# Patient Record
Sex: FEMALE | Race: WHITE | NOT HISPANIC OR LATINO | Employment: STUDENT | URBAN - METROPOLITAN AREA
[De-identification: names, ages, dates, MRNs, and addresses within clinical notes are randomized per-mention and may not be internally consistent; named-entity substitution may affect disease eponyms.]

---

## 2019-12-05 ENCOUNTER — TELEPHONE (OUTPATIENT)
Dept: SURGERY | Facility: CLINIC | Age: 24
End: 2019-12-05

## 2019-12-06 ENCOUNTER — TELEPHONE (OUTPATIENT)
Dept: SURGERY | Facility: CLINIC | Age: 24
End: 2019-12-06

## 2019-12-06 NOTE — TELEPHONE ENCOUNTER
Returned call to  regarding her daughter Arely daughter having Right Breast Pain . Patient has a Bilateral Breast Ultra Sound that was abnormal It shows that there is a 3.6cm mass in the right breast . Patient was offered and apt to see Bubba Navas on 11/18/19 patient refused wanted to wait till next year. Patients mother asking for any apt for her daughter. Spoke with Shell OG who said she would be happy to see her on 12/10/19 @ 8:30 am . This apt was scheduled informed patients mother of the date & time apt slip was mailed out on 12/06/19 .

## 2019-12-10 ENCOUNTER — OFFICE VISIT (OUTPATIENT)
Dept: SURGERY | Facility: CLINIC | Age: 24
End: 2019-12-10
Payer: COMMERCIAL

## 2019-12-10 VITALS
TEMPERATURE: 98 F | WEIGHT: 138.25 LBS | SYSTOLIC BLOOD PRESSURE: 128 MMHG | HEIGHT: 64 IN | BODY MASS INDEX: 23.6 KG/M2 | HEART RATE: 78 BPM | DIASTOLIC BLOOD PRESSURE: 76 MMHG

## 2019-12-10 DIAGNOSIS — N64.4 BREAST PAIN: ICD-10-CM

## 2019-12-10 DIAGNOSIS — Z91.89 AT HIGH RISK FOR BREAST CANCER: ICD-10-CM

## 2019-12-10 DIAGNOSIS — Z12.39 BREAST CANCER SCREENING: Primary | ICD-10-CM

## 2019-12-10 DIAGNOSIS — N63.10 BREAST MASS, RIGHT: ICD-10-CM

## 2019-12-10 PROCEDURE — 3008F PR BODY MASS INDEX (BMI) DOCUMENTED: ICD-10-PCS | Mod: CPTII,S$GLB,, | Performed by: PHYSICIAN ASSISTANT

## 2019-12-10 PROCEDURE — 99999 PR PBB SHADOW E&M-EST. PATIENT-LVL III: ICD-10-PCS | Mod: PBBFAC,,, | Performed by: PHYSICIAN ASSISTANT

## 2019-12-10 PROCEDURE — 99203 OFFICE O/P NEW LOW 30 MIN: CPT | Mod: S$GLB,,, | Performed by: PHYSICIAN ASSISTANT

## 2019-12-10 PROCEDURE — 3008F BODY MASS INDEX DOCD: CPT | Mod: CPTII,S$GLB,, | Performed by: PHYSICIAN ASSISTANT

## 2019-12-10 PROCEDURE — 99999 PR PBB SHADOW E&M-EST. PATIENT-LVL III: CPT | Mod: PBBFAC,,, | Performed by: PHYSICIAN ASSISTANT

## 2019-12-10 PROCEDURE — 99203 PR OFFICE/OUTPT VISIT, NEW, LEVL III, 30-44 MIN: ICD-10-PCS | Mod: S$GLB,,, | Performed by: PHYSICIAN ASSISTANT

## 2019-12-10 NOTE — PROGRESS NOTES
[facility]  [facility] [place]  12/10/2019       SUBJECTIVE:   Ms. Arely Valles is a 24 y.o. female who presents today complaining of a painful RIGHT breast mass.    History of Present Illness: Patient states her OBGYN noticed a mass in her RIGHT breast at age 18.  She thought it went away, but this past January she noticed it again and it has started bothering her more frequently.    On 11/15/19 She had a bilateral breast ultrasound at an outside facility that showed a 3.6 cm lobulated mass with circumscribed margins at the 8 o'clock position of the right breast.   This was given a differential diagnosis of a phyllodes tumor versus a fibroadenoma with low suspicion for malignancy (BIRADS 4A).  A biopsy was not performed and we do not have her imaging on file.    She denies any history of breast biopsies.  She denies any nipple discharge or nipple inversion.  She denies palpating any left breast masses.      Past Medical History:   Diagnosis Date    Angioedema 12/31/2014    AR (allergic rhinitis) 3/27/2013    Eczema      Past Surgical History:   Procedure Laterality Date    ADENOIDECTOMY      SINUS SURGERY      TONSILLECTOMY       Social History     Socioeconomic History    Marital status: Single     Spouse name: Not on file    Number of children: Not on file    Years of education: Not on file    Highest education level: Not on file   Occupational History    Not on file   Social Needs    Financial resource strain: Not on file    Food insecurity:     Worry: Not on file     Inability: Not on file    Transportation needs:     Medical: Not on file     Non-medical: Not on file   Tobacco Use    Smoking status: Never Smoker    Smokeless tobacco: Never Used   Substance and Sexual Activity    Alcohol use: No    Drug use: Not on file    Sexual activity: Not on file   Lifestyle    Physical activity:     Days per week: Not on file     Minutes per session: Not on file    Stress: Not on  "file   Relationships    Social connections:     Talks on phone: Not on file     Gets together: Not on file     Attends Anabaptist service: Not on file     Active member of club or organization: Not on file     Attends meetings of clubs or organizations: Not on file     Relationship status: Not on file   Other Topics Concern    Not on file   Social History Narrative    Not on file     Review of patient's allergies indicates:  No Known Allergies     Family History: Paternal grandmother was diagnosed with breast cancer at age 55.  Paternal uncle had pancreatic cancer. Maternal uncle had prostate cancer. Maternal grandfather and maternal aunt had a melanoma.    OB/Gyn history:    Number of pregnancies & age at first gestation:    Age of menarche & menopause: 14; N/A   Last menstrual period: "a few weeks ago".    Body mass index is 23.73 kg/m².   Contraceptive Use/ HRT: Denies HRT   Breastfeeding: N/A   Number of previous biopsies: 0    Tyrer-Cuzick Score: 23%    Review of Systems: Denies any chest pain or shortness of breath.  Denies any fever or chills.  See HPI/ Interval History for other systems reviewed.    OBJECTIVE:   Vitals:    12/10/19 0842   BP: 128/76   Pulse: 78   Temp: 98 °F (36.7 °C)      Physical Exam:  HEENT: Normocephalic, atraumatic.    General: alert and oriented; no acute distress.  Breast: ~3.5 cm mobile, rubbery mass at the 8'clock position of the right breast, 4 cm from the NAC.  No left breast masses. Normal color and contour of right and left breast.  No nipple inversion or discharge bilaterally.    Lymph: No palpable adjacent axillary lymph nodes.      ASSESSMENT:  Ms. Arely Valles is a 24 y.o. year old high risk female with a painful RIGHT breast mass.    PLAN:   We discussed that we will need to obtain her images and have our radiologists review them.  We can then schedule her for a biopsy to get a more accurate diagnosis.  The patient is interested in having this surgically excised " so will refer her to Dr. Keyes after the biopsy is completed.  We also discussed that her Tyrer-Cuzick score is elevated compared to the average woman, and she will need to start high risk screening at age 37.  I recommended that she see me around age 36 so we can order imaging at that time and discuss this in more detail.    ~Shell Harris PA-C      Surgical Oncology            12/10/2019

## 2019-12-30 ENCOUNTER — TELEPHONE (OUTPATIENT)
Dept: SURGERY | Facility: CLINIC | Age: 24
End: 2019-12-30

## 2019-12-30 NOTE — TELEPHONE ENCOUNTER
Contacted the patient regarding the message below. I stated to the pt mother that we haven't received her outside images yet. I stated to her once we receive them we will give her a call to schedule.         ----- Message from LENKA Lizarraga sent at 12/30/2019 12:19 PM CST -----  Contact: Pt Mom Zulay   Please let her know we need to get her outside images so our radiologists can review them before we schedule anything.  Have we not received them?      ----- Message -----  From: Edita Caban MA  Sent: 12/30/2019  11:01 AM CST  To: LENKA Lizarraga    See message below.  ----- Message -----  From: Libertad Velez  Sent: 12/30/2019  10:52 AM CST  To: Steven Goff Staff    Reason: Pt calling to get some clarification on her visit she had.  She stated that someone was suppose to get her Ultrasound sent over in order to schedule biopsy. She stated no one never reached out as of yet.    Communication: [phone]

## 2020-01-30 ENCOUNTER — TELEPHONE (OUTPATIENT)
Dept: SURGERY | Facility: CLINIC | Age: 25
End: 2020-01-30

## 2020-01-30 NOTE — TELEPHONE ENCOUNTER
----- Message from LENKA Lizarraga sent at 1/30/2020  3:45 PM CST -----  Oh ok.  Danna or Fam, would one of you mind calling the patient to have her bring in her images?  ~Shell    ----- Message -----  From: Rodney Fulton LPN  Sent: 1/30/2020   3:37 PM CST  To: Fam Dai MA, #    Shell,     The patient images were done at the [facility] and the Radiologist will need to review her images before I can schedule.  The radiologist can not see the images for [facility], the patient would have to get us a CD to upload for review.     Thanks,   Rodney   ----- Message -----  From: LENKA Lizarraga  Sent: 1/30/2020   3:26 PM CST  To: Rodney Fulton LPN, YVETTE Dawkins,  Please call this patient when you get a chance..she needs a breast biopsy.      ~Shell

## 2020-01-30 NOTE — TELEPHONE ENCOUNTER
Contacted the patient regarding the message below.  The patient did not answer, message left with my name and direct number for patient to contact back.  Shell Harris PA-C notified of this matter.

## 2020-02-03 ENCOUNTER — DOCUMENTATION ONLY (OUTPATIENT)
Dept: SURGERY | Facility: CLINIC | Age: 25
End: 2020-02-03

## 2020-02-03 NOTE — PROGRESS NOTES
Contacted the [facility] in [place] regarding breast imaging disc being mailed to our facility.  Per Katrina the disc was mailed out on Thursday 1/30/2020.

## 2020-02-06 ENCOUNTER — HOSPITAL ENCOUNTER (OUTPATIENT)
Dept: RADIOLOGY | Facility: HOSPITAL | Age: 25
Discharge: HOME | End: 2020-02-06
Attending: PHYSICIAN ASSISTANT
Payer: COMMERCIAL

## 2020-02-06 ENCOUNTER — TELEPHONE (OUTPATIENT)
Dept: SURGERY | Facility: CLINIC | Age: 25
End: 2020-02-06

## 2020-02-06 NOTE — TELEPHONE ENCOUNTER
Returned patient's mother call regarding the message below.  Informed the patient's mother that our office received a copy of the patient's breast imaging from [facility] in [place] on today and will have them submitted to the breast imaging radiologist to review before scheduling a breast biopsy.  Also, explained once the review is completed the breast imaging nurse will contact the patient to scheduled as needed.  The patient's mother voiced understanding of this information.

## 2020-02-06 NOTE — TELEPHONE ENCOUNTER
----- Message from Edita Caban MA sent at 2/6/2020 11:03 AM CST -----  Contact: Pt(Mother)      ----- Message -----  From: David Mathias  Sent: 2/6/2020   9:47 AM CST  To: Steven Goff Staff    Patient's mother called requesting an appt to have the patient's biopsy perform stated that the matter is urgent and is requesting a callback to discuss appt availability    [phone]

## 2020-02-10 ENCOUNTER — TELEPHONE (OUTPATIENT)
Dept: RADIOLOGY | Facility: HOSPITAL | Age: 25
End: 2020-02-10

## 2020-02-21 ENCOUNTER — HOSPITAL ENCOUNTER (OUTPATIENT)
Dept: RADIOLOGY | Facility: HOSPITAL | Age: 25
Discharge: HOME | End: 2020-02-21
Attending: PHYSICIAN ASSISTANT
Payer: COMMERCIAL

## 2020-02-21 DIAGNOSIS — N63.10 BREAST MASS, RIGHT: ICD-10-CM

## 2020-02-21 PROCEDURE — 25000003 PHARM REV CODE 250: Mod: PO | Performed by: PHYSICIAN ASSISTANT

## 2020-02-21 PROCEDURE — 88305 TISSUE EXAM BY PATHOLOGIST: ICD-10-PCS | Mod: 26,,, | Performed by: PATHOLOGY

## 2020-02-21 PROCEDURE — 27201044 US BREAST BIOPSY WITH IMAGING 1ST SITE RIGHT: Mod: PO

## 2020-02-21 PROCEDURE — 19083 BX BREAST 1ST LESION US IMAG: CPT | Mod: RT,,, | Performed by: RADIOLOGY

## 2020-02-21 PROCEDURE — A4648 IMPLANTABLE TISSUE MARKER: HCPCS | Mod: PO

## 2020-02-21 PROCEDURE — 88305 TISSUE EXAM BY PATHOLOGIST: CPT | Performed by: PATHOLOGY

## 2020-02-21 PROCEDURE — 19083 US BREAST BIOPSY WITH IMAGING 1ST SITE RIGHT: ICD-10-PCS | Mod: RT,,, | Performed by: RADIOLOGY

## 2020-02-21 PROCEDURE — 88305 TISSUE EXAM BY PATHOLOGIST: CPT | Mod: 26,,, | Performed by: PATHOLOGY

## 2020-02-21 RX ORDER — LIDOCAINE HYDROCHLORIDE AND EPINEPHRINE 20; 10 MG/ML; UG/ML
20 INJECTION, SOLUTION INFILTRATION; PERINEURAL ONCE
Status: COMPLETED | OUTPATIENT
Start: 2020-02-21 | End: 2020-02-21

## 2020-02-21 RX ORDER — LIDOCAINE HYDROCHLORIDE 10 MG/ML
5 INJECTION, SOLUTION EPIDURAL; INFILTRATION; INTRACAUDAL; PERINEURAL ONCE
Status: COMPLETED | OUTPATIENT
Start: 2020-02-21 | End: 2020-02-21

## 2020-02-21 RX ADMIN — LIDOCAINE HYDROCHLORIDE,EPINEPHRINE BITARTRATE 5 ML: 20; .01 INJECTION, SOLUTION INFILTRATION; PERINEURAL at 02:02

## 2020-02-21 RX ADMIN — LIDOCAINE HYDROCHLORIDE 1 ML: 10 INJECTION, SOLUTION EPIDURAL; INFILTRATION; INTRACAUDAL; PERINEURAL at 02:02

## 2020-02-26 LAB
COMMENT: NORMAL
FINAL PATHOLOGIC DIAGNOSIS: NORMAL
GROSS: NORMAL

## 2020-02-27 ENCOUNTER — TELEPHONE (OUTPATIENT)
Dept: SURGERY | Facility: CLINIC | Age: 25
End: 2020-02-27

## 2020-02-27 NOTE — TELEPHONE ENCOUNTER
Patient called with results of breast biopsy from 2/21/2020,   no atypia/benign, all questions answered, pt advised to follow up in 6 months with repeat imaging per core biopsy protocol.  reviewed biopsy results and results are benign and concordant.  Discussed scheduling patient with breast surgeon to discuss removing the area. She discussed this with DALIA Harris PA-C at her visit back in December of 2019. Patient stated she was not sure she wanted to have it removed anymore. I let her know that she can call us anytime to schedule the appt or come back to see Shell if she wanted to discuss it further.  Patient verbalized understanding.

## 2020-07-16 NOTE — TELEPHONE ENCOUNTER
Fam, the Right Breast US was abnormal. Its shows that she has a 3.6cm Mass in the right breast. Please contact patient to schedule.    Thanks,      ----- Message from Adryan Santana sent at 12/5/2019  2:12 PM CST -----  Contact: Zulay the Pt's mother      The caller states that the Pt has a lump in her right breast.  The caller states that the Pt has had US of the right breast.  Please contact the caller to discuss additional imaging and a consult appt as a New Pt.      Phone # [phone]    
ortho resident

## 2021-09-28 ENCOUNTER — HOSPITAL ENCOUNTER (EMERGENCY)
Facility: HOSPITAL | Age: 26
Discharge: HOME | End: 2021-09-28
Attending: EMERGENCY MEDICINE
Payer: COMMERCIAL

## 2021-09-28 VITALS
OXYGEN SATURATION: 97 % | HEART RATE: 70 BPM | BODY MASS INDEX: 20.83 KG/M2 | WEIGHT: 122 LBS | HEIGHT: 64 IN | SYSTOLIC BLOOD PRESSURE: 135 MMHG | RESPIRATION RATE: 18 BRPM | TEMPERATURE: 98 F | DIASTOLIC BLOOD PRESSURE: 87 MMHG

## 2021-09-28 DIAGNOSIS — R06.02 SHORTNESS OF BREATH: Primary | ICD-10-CM

## 2021-09-28 LAB
B-HCG UR QL: NEGATIVE
BASOPHILS # BLD AUTO: 0.03 K/UL (ref 0–0.2)
BASOPHILS NFR BLD: 0.3 % (ref 0–1.9)
CTP QC/QA: YES
CTP QC/QA: YES
D DIMER PPP IA.FEU-MCNC: <0.19 MG/L FEU
DIFFERENTIAL METHOD: ABNORMAL
EOSINOPHIL # BLD AUTO: 0 K/UL (ref 0–0.5)
EOSINOPHIL NFR BLD: 0 % (ref 0–8)
ERYTHROCYTE [DISTWIDTH] IN BLOOD BY AUTOMATED COUNT: 13 % (ref 11.5–14.5)
HCT VFR BLD AUTO: 39.5 % (ref 37–48.5)
HGB BLD-MCNC: 13.2 G/DL (ref 12–16)
IMM GRANULOCYTES # BLD AUTO: 0.05 K/UL (ref 0–0.04)
IMM GRANULOCYTES NFR BLD AUTO: 0.4 % (ref 0–0.5)
LYMPHOCYTES # BLD AUTO: 1.7 K/UL (ref 1–4.8)
LYMPHOCYTES NFR BLD: 14.9 % (ref 18–48)
MCH RBC QN AUTO: 29.7 PG (ref 27–31)
MCHC RBC AUTO-ENTMCNC: 33.4 G/DL (ref 32–36)
MCV RBC AUTO: 89 FL (ref 82–98)
MONOCYTES # BLD AUTO: 0.9 K/UL (ref 0.3–1)
MONOCYTES NFR BLD: 7.5 % (ref 4–15)
NEUTROPHILS # BLD AUTO: 8.7 K/UL (ref 1.8–7.7)
NEUTROPHILS NFR BLD: 76.9 % (ref 38–73)
NRBC BLD-RTO: 0 /100 WBC
PLATELET # BLD AUTO: 258 K/UL (ref 150–450)
PMV BLD AUTO: 10.4 FL (ref 9.2–12.9)
RBC # BLD AUTO: 4.44 M/UL (ref 4–5.4)
SARS-COV-2 RDRP RESP QL NAA+PROBE: NEGATIVE
WBC # BLD AUTO: 11.29 K/UL (ref 3.9–12.7)

## 2021-09-28 PROCEDURE — 93010 ELECTROCARDIOGRAM REPORT: CPT | Mod: ,,, | Performed by: INTERNAL MEDICINE

## 2021-09-28 PROCEDURE — 93005 ELECTROCARDIOGRAM TRACING: CPT

## 2021-09-28 PROCEDURE — 85025 COMPLETE CBC W/AUTO DIFF WBC: CPT | Performed by: EMERGENCY MEDICINE

## 2021-09-28 PROCEDURE — 99285 EMERGENCY DEPT VISIT HI MDM: CPT | Mod: 25

## 2021-09-28 PROCEDURE — 93010 EKG 12-LEAD: ICD-10-PCS | Mod: ,,, | Performed by: INTERNAL MEDICINE

## 2021-09-28 PROCEDURE — 81025 URINE PREGNANCY TEST: CPT | Performed by: EMERGENCY MEDICINE

## 2021-09-28 PROCEDURE — 87389 HIV-1 AG W/HIV-1&-2 AB AG IA: CPT | Performed by: EMERGENCY MEDICINE

## 2021-09-28 PROCEDURE — 86803 HEPATITIS C AB TEST: CPT | Performed by: EMERGENCY MEDICINE

## 2021-09-28 PROCEDURE — 80047 BASIC METABLC PNL IONIZED CA: CPT

## 2021-09-28 PROCEDURE — 36000 PLACE NEEDLE IN VEIN: CPT

## 2021-09-28 PROCEDURE — 85379 FIBRIN DEGRADATION QUANT: CPT | Performed by: EMERGENCY MEDICINE

## 2021-09-28 PROCEDURE — 99285 PR EMERGENCY DEPT VISIT,LEVEL V: ICD-10-PCS | Mod: CR,CS,, | Performed by: EMERGENCY MEDICINE

## 2021-09-28 PROCEDURE — 99285 EMERGENCY DEPT VISIT HI MDM: CPT | Mod: CR,CS,, | Performed by: EMERGENCY MEDICINE

## 2021-09-28 PROCEDURE — U0002 COVID-19 LAB TEST NON-CDC: HCPCS | Performed by: EMERGENCY MEDICINE

## 2021-09-29 LAB
BUN SERPL-MCNC: 14 MG/DL (ref 6–30)
CHLORIDE SERPL-SCNC: 102 MMOL/L (ref 95–110)
CREAT SERPL-MCNC: 0.7 MG/DL (ref 0.5–1.4)
GLUCOSE SERPL-MCNC: 116 MG/DL (ref 70–110)
HCT VFR BLD CALC: 41 %PCV (ref 36–54)
HCV AB SERPL QL IA: NEGATIVE
HIV 1+2 AB+HIV1 P24 AG SERPL QL IA: NEGATIVE
POC IONIZED CALCIUM: 1.24 MMOL/L (ref 1.06–1.42)
POC TCO2 (MEASURED): 26 MMOL/L (ref 23–29)
POTASSIUM BLD-SCNC: 4.2 MMOL/L (ref 3.5–5.1)
SAMPLE: ABNORMAL
SODIUM BLD-SCNC: 141 MMOL/L (ref 136–145)

## 2021-10-01 ENCOUNTER — LAB VISIT (OUTPATIENT)
Dept: LAB | Facility: HOSPITAL | Age: 26
End: 2021-10-01
Attending: INTERNAL MEDICINE
Payer: COMMERCIAL

## 2021-10-01 DIAGNOSIS — R53.83 FATIGUE, UNSPECIFIED TYPE: ICD-10-CM

## 2021-10-01 DIAGNOSIS — M79.10 MYALGIA: ICD-10-CM

## 2021-10-01 PROBLEM — F33.41 RECURRENT MAJOR DEPRESSIVE DISORDER, IN PARTIAL REMISSION: Status: ACTIVE | Noted: 2019-02-07

## 2021-10-01 LAB
25(OH)D3+25(OH)D2 SERPL-MCNC: 22 NG/ML (ref 30–96)
ALBUMIN SERPL BCP-MCNC: 4.5 G/DL (ref 3.5–5.2)
ALP SERPL-CCNC: 45 U/L (ref 55–135)
ALT SERPL W/O P-5'-P-CCNC: 41 U/L (ref 10–44)
ANION GAP SERPL CALC-SCNC: 8 MMOL/L (ref 8–16)
AST SERPL-CCNC: 19 U/L (ref 10–40)
BASOPHILS # BLD AUTO: 0.05 K/UL (ref 0–0.2)
BASOPHILS NFR BLD: 0.7 % (ref 0–1.9)
BILIRUB SERPL-MCNC: 0.6 MG/DL (ref 0.1–1)
BUN SERPL-MCNC: 16 MG/DL (ref 6–20)
CALCIUM SERPL-MCNC: 9.7 MG/DL (ref 8.7–10.5)
CHLORIDE SERPL-SCNC: 103 MMOL/L (ref 95–110)
CO2 SERPL-SCNC: 28 MMOL/L (ref 23–29)
CREAT SERPL-MCNC: 0.9 MG/DL (ref 0.5–1.4)
CRP SERPL-MCNC: 0.2 MG/L (ref 0–8.2)
DIFFERENTIAL METHOD: ABNORMAL
EOSINOPHIL # BLD AUTO: 0 K/UL (ref 0–0.5)
EOSINOPHIL NFR BLD: 0 % (ref 0–8)
ERYTHROCYTE [DISTWIDTH] IN BLOOD BY AUTOMATED COUNT: 13 % (ref 11.5–14.5)
ERYTHROCYTE [SEDIMENTATION RATE] IN BLOOD BY WESTERGREN METHOD: 11 MM/HR (ref 0–20)
EST. GFR  (AFRICAN AMERICAN): >60 ML/MIN/1.73 M^2
EST. GFR  (NON AFRICAN AMERICAN): >60 ML/MIN/1.73 M^2
GLUCOSE SERPL-MCNC: 74 MG/DL (ref 70–110)
HCT VFR BLD AUTO: 40.6 % (ref 37–48.5)
HGB BLD-MCNC: 13.6 G/DL (ref 12–16)
IMM GRANULOCYTES # BLD AUTO: 0.07 K/UL (ref 0–0.04)
IMM GRANULOCYTES NFR BLD AUTO: 0.9 % (ref 0–0.5)
LYMPHOCYTES # BLD AUTO: 2.3 K/UL (ref 1–4.8)
LYMPHOCYTES NFR BLD: 30.4 % (ref 18–48)
MCH RBC QN AUTO: 29.6 PG (ref 27–31)
MCHC RBC AUTO-ENTMCNC: 33.5 G/DL (ref 32–36)
MCV RBC AUTO: 89 FL (ref 82–98)
MONOCYTES # BLD AUTO: 0.7 K/UL (ref 0.3–1)
MONOCYTES NFR BLD: 8.8 % (ref 4–15)
NEUTROPHILS # BLD AUTO: 4.4 K/UL (ref 1.8–7.7)
NEUTROPHILS NFR BLD: 59.2 % (ref 38–73)
NRBC BLD-RTO: 0 /100 WBC
PLATELET # BLD AUTO: 268 K/UL (ref 150–450)
PMV BLD AUTO: 9.8 FL (ref 9.2–12.9)
POTASSIUM SERPL-SCNC: 4.1 MMOL/L (ref 3.5–5.1)
PROT SERPL-MCNC: 7.5 G/DL (ref 6–8.4)
RBC # BLD AUTO: 4.59 M/UL (ref 4–5.4)
SODIUM SERPL-SCNC: 139 MMOL/L (ref 136–145)
T4 FREE SERPL-MCNC: 0.99 NG/DL (ref 0.71–1.51)
TSH SERPL DL<=0.005 MIU/L-ACNC: 1.05 UIU/ML (ref 0.4–4)
VIT B12 SERPL-MCNC: 614 PG/ML (ref 210–950)
WBC # BLD AUTO: 7.46 K/UL (ref 3.9–12.7)

## 2021-10-01 PROCEDURE — 80053 COMPREHEN METABOLIC PANEL: CPT | Performed by: INTERNAL MEDICINE

## 2021-10-01 PROCEDURE — 86038 ANTINUCLEAR ANTIBODIES: CPT | Performed by: INTERNAL MEDICINE

## 2021-10-01 PROCEDURE — 36415 COLL VENOUS BLD VENIPUNCTURE: CPT | Performed by: INTERNAL MEDICINE

## 2021-10-01 PROCEDURE — 85025 COMPLETE CBC W/AUTO DIFF WBC: CPT | Performed by: INTERNAL MEDICINE

## 2021-10-01 PROCEDURE — 85652 RBC SED RATE AUTOMATED: CPT | Performed by: INTERNAL MEDICINE

## 2021-10-01 PROCEDURE — 86140 C-REACTIVE PROTEIN: CPT | Performed by: INTERNAL MEDICINE

## 2021-10-01 PROCEDURE — 84443 ASSAY THYROID STIM HORMONE: CPT | Performed by: INTERNAL MEDICINE

## 2021-10-01 PROCEDURE — 84439 ASSAY OF FREE THYROXINE: CPT | Performed by: INTERNAL MEDICINE

## 2021-10-01 PROCEDURE — 82607 VITAMIN B-12: CPT | Performed by: INTERNAL MEDICINE

## 2021-10-01 PROCEDURE — 82306 VITAMIN D 25 HYDROXY: CPT | Performed by: INTERNAL MEDICINE

## 2021-10-04 LAB — ANA SER QL IF: NORMAL

## 2021-10-06 ENCOUNTER — HOSPITAL ENCOUNTER (EMERGENCY)
Facility: HOSPITAL | Age: 26
Discharge: HOME | End: 2021-10-06
Attending: EMERGENCY MEDICINE
Payer: COMMERCIAL

## 2021-10-06 VITALS
WEIGHT: 124 LBS | OXYGEN SATURATION: 100 % | HEART RATE: 83 BPM | TEMPERATURE: 98 F | DIASTOLIC BLOOD PRESSURE: 63 MMHG | BODY MASS INDEX: 21.28 KG/M2 | RESPIRATION RATE: 16 BRPM | SYSTOLIC BLOOD PRESSURE: 107 MMHG

## 2021-10-06 DIAGNOSIS — M62.81 MUSCLE WEAKNESS (GENERALIZED): ICD-10-CM

## 2021-10-06 DIAGNOSIS — M25.50 POLYARTHRALGIA: ICD-10-CM

## 2021-10-06 DIAGNOSIS — R29.898 BILATERAL LEG WEAKNESS: Primary | ICD-10-CM

## 2021-10-06 DIAGNOSIS — R53.1 GENERALIZED WEAKNESS: ICD-10-CM

## 2021-10-06 DIAGNOSIS — R20.2 PARESTHESIAS: ICD-10-CM

## 2021-10-06 LAB
ALBUMIN SERPL BCP-MCNC: 4.1 G/DL (ref 3.5–5.2)
ALP SERPL-CCNC: 39 U/L (ref 55–135)
ALT SERPL W/O P-5'-P-CCNC: 51 U/L (ref 10–44)
ANION GAP SERPL CALC-SCNC: 11 MMOL/L (ref 8–16)
AST SERPL-CCNC: 29 U/L (ref 10–40)
B-HCG UR QL: NEGATIVE
BASOPHILS # BLD AUTO: 0.05 K/UL (ref 0–0.2)
BASOPHILS NFR BLD: 0.7 % (ref 0–1.9)
BILIRUB SERPL-MCNC: 0.6 MG/DL (ref 0.1–1)
BUN SERPL-MCNC: 17 MG/DL (ref 6–20)
CALCIUM SERPL-MCNC: 9.4 MG/DL (ref 8.7–10.5)
CHLORIDE SERPL-SCNC: 105 MMOL/L (ref 95–110)
CO2 SERPL-SCNC: 23 MMOL/L (ref 23–29)
CREAT SERPL-MCNC: 0.7 MG/DL (ref 0.5–1.4)
CTP QC/QA: YES
DIFFERENTIAL METHOD: ABNORMAL
EOSINOPHIL # BLD AUTO: 0.1 K/UL (ref 0–0.5)
EOSINOPHIL NFR BLD: 1.8 % (ref 0–8)
ERYTHROCYTE [DISTWIDTH] IN BLOOD BY AUTOMATED COUNT: 13 % (ref 11.5–14.5)
EST. GFR  (AFRICAN AMERICAN): >60 ML/MIN/1.73 M^2
EST. GFR  (NON AFRICAN AMERICAN): >60 ML/MIN/1.73 M^2
GLUCOSE SERPL-MCNC: 85 MG/DL (ref 70–110)
HCT VFR BLD AUTO: 38 % (ref 37–48.5)
HGB BLD-MCNC: 13.1 G/DL (ref 12–16)
IMM GRANULOCYTES # BLD AUTO: 0.04 K/UL (ref 0–0.04)
IMM GRANULOCYTES NFR BLD AUTO: 0.6 % (ref 0–0.5)
LYMPHOCYTES # BLD AUTO: 2.5 K/UL (ref 1–4.8)
LYMPHOCYTES NFR BLD: 36 % (ref 18–48)
MCH RBC QN AUTO: 29.8 PG (ref 27–31)
MCHC RBC AUTO-ENTMCNC: 34.5 G/DL (ref 32–36)
MCV RBC AUTO: 87 FL (ref 82–98)
MONOCYTES # BLD AUTO: 0.6 K/UL (ref 0.3–1)
MONOCYTES NFR BLD: 8.8 % (ref 4–15)
NEUTROPHILS # BLD AUTO: 3.5 K/UL (ref 1.8–7.7)
NEUTROPHILS NFR BLD: 52.1 % (ref 38–73)
NRBC BLD-RTO: 0 /100 WBC
PLATELET # BLD AUTO: 245 K/UL (ref 150–450)
PMV BLD AUTO: 10.1 FL (ref 9.2–12.9)
POTASSIUM SERPL-SCNC: 4.1 MMOL/L (ref 3.5–5.1)
PROT SERPL-MCNC: 6.8 G/DL (ref 6–8.4)
RBC # BLD AUTO: 4.39 M/UL (ref 4–5.4)
SODIUM SERPL-SCNC: 139 MMOL/L (ref 136–145)
WBC # BLD AUTO: 6.8 K/UL (ref 3.9–12.7)

## 2021-10-06 PROCEDURE — 85025 COMPLETE CBC W/AUTO DIFF WBC: CPT | Performed by: PHYSICIAN ASSISTANT

## 2021-10-06 PROCEDURE — 96374 THER/PROPH/DIAG INJ IV PUSH: CPT

## 2021-10-06 PROCEDURE — 99285 PR EMERGENCY DEPT VISIT,LEVEL V: ICD-10-PCS | Mod: ,,, | Performed by: PHYSICIAN ASSISTANT

## 2021-10-06 PROCEDURE — 80053 COMPREHEN METABOLIC PANEL: CPT | Performed by: PHYSICIAN ASSISTANT

## 2021-10-06 PROCEDURE — 99285 EMERGENCY DEPT VISIT HI MDM: CPT | Mod: 25

## 2021-10-06 PROCEDURE — A9585 GADOBUTROL INJECTION: HCPCS | Performed by: EMERGENCY MEDICINE

## 2021-10-06 PROCEDURE — 63600175 PHARM REV CODE 636 W HCPCS: Performed by: PHYSICIAN ASSISTANT

## 2021-10-06 PROCEDURE — 25000003 PHARM REV CODE 250: Performed by: PHYSICIAN ASSISTANT

## 2021-10-06 PROCEDURE — 25500020 PHARM REV CODE 255: Performed by: EMERGENCY MEDICINE

## 2021-10-06 PROCEDURE — 99285 EMERGENCY DEPT VISIT HI MDM: CPT | Mod: ,,, | Performed by: PHYSICIAN ASSISTANT

## 2021-10-06 PROCEDURE — 81025 URINE PREGNANCY TEST: CPT | Performed by: PHYSICIAN ASSISTANT

## 2021-10-06 RX ORDER — ACETAMINOPHEN 500 MG
1000 TABLET ORAL
Status: COMPLETED | OUTPATIENT
Start: 2021-10-06 | End: 2021-10-06

## 2021-10-06 RX ORDER — HYDROCODONE BITARTRATE AND ACETAMINOPHEN 5; 325 MG/1; MG/1
1 TABLET ORAL
Status: COMPLETED | OUTPATIENT
Start: 2021-10-06 | End: 2021-10-06

## 2021-10-06 RX ORDER — KETOROLAC TROMETHAMINE 30 MG/ML
10 INJECTION, SOLUTION INTRAMUSCULAR; INTRAVENOUS
Status: COMPLETED | OUTPATIENT
Start: 2021-10-06 | End: 2021-10-06

## 2021-10-06 RX ORDER — GADOBUTROL 604.72 MG/ML
6 INJECTION INTRAVENOUS
Status: COMPLETED | OUTPATIENT
Start: 2021-10-06 | End: 2021-10-06

## 2021-10-06 RX ORDER — METHOCARBAMOL 500 MG/1
1000 TABLET, FILM COATED ORAL
Status: COMPLETED | OUTPATIENT
Start: 2021-10-06 | End: 2021-10-06

## 2021-10-06 RX ADMIN — METHOCARBAMOL 1000 MG: 500 TABLET ORAL at 03:10

## 2021-10-06 RX ADMIN — KETOROLAC TROMETHAMINE 10 MG: 30 INJECTION, SOLUTION INTRAMUSCULAR at 03:10

## 2021-10-06 RX ADMIN — GADOBUTROL 6 ML: 604.72 INJECTION INTRAVENOUS at 08:10

## 2021-10-06 RX ADMIN — ACETAMINOPHEN 1000 MG: 500 TABLET ORAL at 03:10

## 2021-10-06 RX ADMIN — HYDROCODONE BITARTRATE AND ACETAMINOPHEN 1 TABLET: 5; 325 TABLET ORAL at 06:10

## 2021-10-26 PROBLEM — R20.2 PARESTHESIAS: Status: ACTIVE | Noted: 2021-10-22

## 2021-10-26 PROBLEM — M25.50 DIFFUSE ARTHRALGIA: Status: ACTIVE | Noted: 2021-10-22

## 2021-10-26 PROBLEM — M79.10 MYALGIA: Status: ACTIVE | Noted: 2021-10-22

## 2021-11-02 ENCOUNTER — OFFICE VISIT (OUTPATIENT)
Dept: ENDOCRINOLOGY | Facility: CLINIC | Age: 26
End: 2021-11-02
Payer: COMMERCIAL

## 2021-11-02 VITALS
SYSTOLIC BLOOD PRESSURE: 104 MMHG | HEART RATE: 68 BPM | DIASTOLIC BLOOD PRESSURE: 76 MMHG | BODY MASS INDEX: 23.71 KG/M2 | OXYGEN SATURATION: 94 % | RESPIRATION RATE: 18 BRPM | HEIGHT: 64 IN | WEIGHT: 138.88 LBS

## 2021-11-02 DIAGNOSIS — R73.09 ELEVATED RANDOM BLOOD GLUCOSE LEVEL: ICD-10-CM

## 2021-11-02 DIAGNOSIS — M25.50 DIFFUSE ARTHRALGIA: ICD-10-CM

## 2021-11-02 DIAGNOSIS — R74.8 LOW SERUM ALKALINE PHOSPHATASE: ICD-10-CM

## 2021-11-02 DIAGNOSIS — M79.10 MYALGIA: ICD-10-CM

## 2021-11-02 DIAGNOSIS — E55.9 VITAMIN D DEFICIENCY: ICD-10-CM

## 2021-11-02 PROCEDURE — 99999 PR PBB SHADOW E&M-EST. PATIENT-LVL IV: CPT | Mod: PBBFAC,,, | Performed by: INTERNAL MEDICINE

## 2021-11-02 PROCEDURE — 3008F PR BODY MASS INDEX (BMI) DOCUMENTED: ICD-10-PCS | Mod: CPTII,S$GLB,, | Performed by: INTERNAL MEDICINE

## 2021-11-02 PROCEDURE — 3078F PR MOST RECENT DIASTOLIC BLOOD PRESSURE < 80 MM HG: ICD-10-PCS | Mod: CPTII,S$GLB,, | Performed by: INTERNAL MEDICINE

## 2021-11-02 PROCEDURE — 3074F SYST BP LT 130 MM HG: CPT | Mod: CPTII,S$GLB,, | Performed by: INTERNAL MEDICINE

## 2021-11-02 PROCEDURE — 1159F MED LIST DOCD IN RCRD: CPT | Mod: CPTII,S$GLB,, | Performed by: INTERNAL MEDICINE

## 2021-11-02 PROCEDURE — 3074F PR MOST RECENT SYSTOLIC BLOOD PRESSURE < 130 MM HG: ICD-10-PCS | Mod: CPTII,S$GLB,, | Performed by: INTERNAL MEDICINE

## 2021-11-02 PROCEDURE — 1159F PR MEDICATION LIST DOCUMENTED IN MEDICAL RECORD: ICD-10-PCS | Mod: CPTII,S$GLB,, | Performed by: INTERNAL MEDICINE

## 2021-11-02 PROCEDURE — 99204 PR OFFICE/OUTPT VISIT, NEW, LEVL IV, 45-59 MIN: ICD-10-PCS | Mod: S$GLB,,, | Performed by: INTERNAL MEDICINE

## 2021-11-02 PROCEDURE — 99204 OFFICE O/P NEW MOD 45 MIN: CPT | Mod: S$GLB,,, | Performed by: INTERNAL MEDICINE

## 2021-11-02 PROCEDURE — 99999 PR PBB SHADOW E&M-EST. PATIENT-LVL IV: ICD-10-PCS | Mod: PBBFAC,,, | Performed by: INTERNAL MEDICINE

## 2021-11-02 PROCEDURE — 3078F DIAST BP <80 MM HG: CPT | Mod: CPTII,S$GLB,, | Performed by: INTERNAL MEDICINE

## 2021-11-02 PROCEDURE — 3008F BODY MASS INDEX DOCD: CPT | Mod: CPTII,S$GLB,, | Performed by: INTERNAL MEDICINE

## 2021-12-29 DIAGNOSIS — N63.10 BREAST MASS, RIGHT: Primary | ICD-10-CM

## 2022-01-21 ENCOUNTER — PATIENT MESSAGE (OUTPATIENT)
Dept: PSYCHIATRY | Facility: CLINIC | Age: 27
End: 2022-01-21
Payer: COMMERCIAL

## 2022-01-25 ENCOUNTER — PATIENT MESSAGE (OUTPATIENT)
Dept: PSYCHIATRY | Facility: CLINIC | Age: 27
End: 2022-01-25
Payer: COMMERCIAL

## 2022-01-28 ENCOUNTER — OFFICE VISIT (OUTPATIENT)
Dept: PSYCHIATRY | Facility: CLINIC | Age: 27
End: 2022-01-28
Payer: COMMERCIAL

## 2022-01-28 ENCOUNTER — HOSPITAL ENCOUNTER (OUTPATIENT)
Dept: RADIOLOGY | Facility: HOSPITAL | Age: 27
Discharge: HOME | End: 2022-01-28
Attending: SURGERY
Payer: COMMERCIAL

## 2022-01-28 VITALS
WEIGHT: 150 LBS | SYSTOLIC BLOOD PRESSURE: 119 MMHG | DIASTOLIC BLOOD PRESSURE: 79 MMHG | BODY MASS INDEX: 25.75 KG/M2 | HEART RATE: 91 BPM

## 2022-01-28 DIAGNOSIS — F41.0 PANIC ATTACKS: ICD-10-CM

## 2022-01-28 DIAGNOSIS — F34.1 PERSISTENT DEPRESSIVE DISORDER: ICD-10-CM

## 2022-01-28 DIAGNOSIS — N63.10 BREAST MASS, RIGHT: ICD-10-CM

## 2022-01-28 DIAGNOSIS — F40.10 SOCIAL ANXIETY DISORDER: Primary | ICD-10-CM

## 2022-01-28 DIAGNOSIS — Z86.59 HISTORY OF ADHD: ICD-10-CM

## 2022-01-28 PROCEDURE — 76642 US BREAST RIGHT LIMITED: ICD-10-PCS | Mod: 26,RT,, | Performed by: RADIOLOGY

## 2022-01-28 PROCEDURE — 3078F PR MOST RECENT DIASTOLIC BLOOD PRESSURE < 80 MM HG: ICD-10-PCS | Mod: CPTII,95,, | Performed by: PSYCHIATRY & NEUROLOGY

## 2022-01-28 PROCEDURE — 76642 ULTRASOUND BREAST LIMITED: CPT | Mod: 26,RT,, | Performed by: RADIOLOGY

## 2022-01-28 PROCEDURE — 76642 ULTRASOUND BREAST LIMITED: CPT | Mod: TC,RT

## 2022-01-28 PROCEDURE — 3008F PR BODY MASS INDEX (BMI) DOCUMENTED: ICD-10-PCS | Mod: CPTII,95,, | Performed by: PSYCHIATRY & NEUROLOGY

## 2022-01-28 PROCEDURE — 3078F DIAST BP <80 MM HG: CPT | Mod: CPTII,95,, | Performed by: PSYCHIATRY & NEUROLOGY

## 2022-01-28 PROCEDURE — 90792 PSYCH DIAG EVAL W/MED SRVCS: CPT | Mod: 95,,, | Performed by: PSYCHIATRY & NEUROLOGY

## 2022-01-28 PROCEDURE — 3008F BODY MASS INDEX DOCD: CPT | Mod: CPTII,95,, | Performed by: PSYCHIATRY & NEUROLOGY

## 2022-01-28 PROCEDURE — 3074F SYST BP LT 130 MM HG: CPT | Mod: CPTII,95,, | Performed by: PSYCHIATRY & NEUROLOGY

## 2022-01-28 PROCEDURE — 3074F PR MOST RECENT SYSTOLIC BLOOD PRESSURE < 130 MM HG: ICD-10-PCS | Mod: CPTII,95,, | Performed by: PSYCHIATRY & NEUROLOGY

## 2022-01-28 PROCEDURE — 90792 PR PSYCHIATRIC DIAGNOSTIC EVALUATION W/MEDICAL SERVICES: ICD-10-PCS | Mod: 95,,, | Performed by: PSYCHIATRY & NEUROLOGY

## 2022-01-28 RX ORDER — LAMOTRIGINE 25 MG/1
25 TABLET ORAL DAILY
Qty: 42 TABLET | Refills: 0 | Status: SHIPPED | OUTPATIENT
Start: 2022-01-28 | End: 2022-02-15

## 2022-01-28 RX ORDER — AMITRIPTYLINE HYDROCHLORIDE 50 MG/1
100 TABLET, FILM COATED ORAL NIGHTLY
COMMUNITY

## 2022-01-28 NOTE — PATIENT INSTRUCTIONS
1. Continue Wellbutrin XL 150mg daily.  2. When financially possible, resume therapy  3. Start lamictal 25mg daily for two weeks.    Return in 2 weeks.
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
## 2022-02-07 NOTE — PROGRESS NOTES
[facility]  Department of Surgery      REFERRING PROVIDER: Bautista Parra MD  [address] [facility]  [address]  [place] [address]    Chief Complaint: Breast Mass (New patient presents today for a evaluation for a right breast lump. She does complain that it is tender to the touch.) and Breast Pain      Subjective:      Patient ID: Arely Valles is a 26 y.o. female who presents with right breast mass. She originally discovered the mass when she was 18 years old and it has progressively grown since that time. She was followed clinically until 2019 when she was seen by LENKA Hanna 12/2019 for painful right breast mass. She had imaging done at [facility] on 11/15/2019 which showed a 3.6 cm lobulated mass with circumscribed margins at the 8 o'clock position of the right breast.  This was given a differential diagnosis of a phyllodes tumor versus a fibroadenoma with low suspicion for malignancy (BIRADS 4A). Patient was seen here at [facility] on 12/10/2019 by LENKA Hanna for second opinion. Imaging was reviewed by our team who also recommended US guided biopsy of the right breast mass. On 2/21/2020 US guided biopsy was performed which revealed Fibroadenoma, no atypia. At this time patient was not interested in surgical consultation. On 1/28/2022 patient had follow up right breast US which showed increased in size of previously biopsied palpable mass in the right breast to 4.5 cm (previously 3.6 cm); given BI-RADS 4A.       Patient does not routinely do self breast exams.  Patient has noted a change on breast exam.  Patient denies nipple discharge. Patient admits to previous breast biopsy. Patient denies a personal history of breast cancer. Prior history of angioedema in 2014 per EMR, patient unaware. Denies history of throat or neck swelling or allergic reactions requiring hospitalization. Reports history of asthma requiring PRN nebulizer therapy after exposure to tobacco smoke.    GYN  History:  Age of menarche was 15.   Age of menopause was N/A.    Last menstrual period was January 2022.   Patient has hormonal therapy, nexplanon total of 9 year.   Patient is G0     Family History:  Breat Cancer: Paternal Grandmother, Breast Cancer Dx in 50s; Maternal great aunt and 2nd cousin with Breast Cancer  Other Cancer: Paternal uncle, pancreatic cancer; cousin possible breast cancer, undergoing surgery tomorrow (Age: 20s); No colon, ovarian, or uterine cancer    Past Medical History:   Diagnosis Date    Angioedema 12/31/2014    AR (allergic rhinitis) 3/27/2013    Eczema      Past Surgical History:   Procedure Laterality Date    ADENOIDECTOMY      BREAST BIOPSY      SINUS SURGERY      TONSILLECTOMY       Current Outpatient Medications on File Prior to Visit   Medication Sig Dispense Refill    amitriptyline (ELAVIL) 50 MG tablet Take 50 mg by mouth every evening.      buPROPion (WELLBUTRIN XL) 150 MG TB24 tablet Take 1 tablet (150 mg total) by mouth every morning. 30 tablet 6    ETONOGESTREL (IMPLANON SDRM) by Subdermal route.      lamoTRIgine (LAMICTAL) 25 MG tablet Take 1 tablet (25 mg total) by mouth once daily. 42 tablet 0    vitamin D (VITAMIN D3) 1000 units Tab Take 1,000 Units by mouth 2 (two) times a day.       No current facility-administered medications on file prior to visit.     Social History     Socioeconomic History    Marital status: Single   Tobacco Use    Smoking status: Current Every Day Smoker    Smokeless tobacco: Never Used   Substance and Sexual Activity    Alcohol use: No    Drug use: Not Currently     Family History   Problem Relation Age of Onset    Allergies Brother     Asthma Brother     Eczema Brother     Allergic rhinitis Brother     Breast cancer Paternal Grandmother     Allergic rhinitis Mother     No Known Problems Father     No Known Problems Sister     No Known Problems Maternal Aunt     No Known Problems Maternal Uncle     No Known Problems  "Paternal Aunt     No Known Problems Paternal Uncle     No Known Problems Maternal Grandmother     No Known Problems Maternal Grandfather     No Known Problems Paternal Grandfather     Colon cancer Neg Hx     Ovarian cancer Neg Hx     Angioedema Neg Hx     Atopy Neg Hx     Immunodeficiency Neg Hx     Rhinitis Neg Hx     Urticaria Neg Hx         Review of Systems   Constitutional: Negative for activity change, appetite change, chills, fever and unexpected weight change.   HENT:        Prior lymphadenopathy L cervical chain, posterior auricular nodes (> 5 years ago, but near the time of initial discovery of breast mass) managed with antibiotics. Has never recurred.    Respiratory: Negative for chest tightness and shortness of breath.    Cardiovascular: Negative for chest pain.   Gastrointestinal: Negative for abdominal pain, blood in stool, constipation, diarrhea and nausea.   Genitourinary: Negative for difficulty urinating and dysuria.   Musculoskeletal: Positive for arthralgias (Recent diagnosis of Fibromyalgia).   Neurological: Negative for seizures and numbness.     Objective:   /79 (BP Location: Right arm, Patient Position: Sitting, BP Method: Medium (Automatic))   Pulse 92   Ht 5' 4" (1.626 m)   Wt 70.8 kg (156 lb)   LMP 01/15/2022 (Approximate)   BMI 26.78 kg/m²     Physical Exam   Constitutional: She is oriented to person, place, and time. She appears well-developed and well-nourished. No distress.   HENT:   Head: Normocephalic and atraumatic.   Eyes: Conjunctivae and EOM are normal. No scleral icterus.   Cardiovascular: Normal rate and regular rhythm.    Pulmonary/Chest: Effort normal. No respiratory distress. Right breast exhibits mass (R breast mass 7 o'clock, 3 cm from nipple. Mobile. Measures approximately 5 cm by 3 cm in size). Right breast exhibits no inverted nipple, no nipple discharge, no skin change and no tenderness. Left breast exhibits no inverted nipple, no mass, no nipple " discharge, no skin change and no tenderness. No breast swelling or bleeding. Breasts are asymmetrical.   Musculoskeletal: Normal range of motion.   Lymphadenopathy:     She has no cervical adenopathy.   Neurological: She is alert and oriented to person, place, and time.   Skin: She is not diaphoretic.         Radiology review: Images personally reviewed by me in the clinic.   Ultrasound (1/28/22:  Impression:  Interval increased in size of a previously biopsied palpable oval hypoechoic circumscribed mass in the RIGHT breast with pathology demonstrating a fibroadenoma. Given interval increase in size, breast surgical consultation is recommended to discuss excision for definitive diagnosis and management.     BI-RADS Category:   Overall: 4A - Low Suspicion for Malignancy  Assessment:       1. Fibroadenoma of breast, right    2. Pre-op testing        Plan:     We discussed the options for management of fibroadenoma.    We discussed observation vs. surgical excision. Surgical excision is usually recommended if the mass is over 2 cm, growing, or is symptomatic.  If we move forward with observation, we will need repeat imaging in 6 months and will need to ensure stability for 2 years.  Patient understands the options.  All questions were asked and answered.  Patient elects to proceed with surgical excision.    We discussed the potential for genetic testing in this patient's case. Based on patient's family history, patient is at low risk for heritable breast cancer. Discussed the option of referral to a , however the patient would like to defer this until her cousin's surgery is complete.     Surgery will be coordinated with the patient's schedule.  Risks and benefits were explained including but not limited to bleeding, infection, pain, recurrence, and need for further surgery.  Consent signed in clinic.    30 minutes were spent on this encounter, 25 of which was face to face counseling and 5 minutes were spent  on chart review and coordination of care.

## 2022-02-08 ENCOUNTER — OFFICE VISIT (OUTPATIENT)
Dept: SURGERY | Facility: CLINIC | Age: 27
End: 2022-02-08
Payer: COMMERCIAL

## 2022-02-08 VITALS
SYSTOLIC BLOOD PRESSURE: 125 MMHG | WEIGHT: 156 LBS | HEIGHT: 64 IN | HEART RATE: 92 BPM | DIASTOLIC BLOOD PRESSURE: 79 MMHG | BODY MASS INDEX: 26.63 KG/M2

## 2022-02-08 DIAGNOSIS — D24.1 FIBROADENOMA OF BREAST, RIGHT: Primary | ICD-10-CM

## 2022-02-08 DIAGNOSIS — Z01.818 PRE-OP TESTING: ICD-10-CM

## 2022-02-08 PROCEDURE — 1160F RVW MEDS BY RX/DR IN RCRD: CPT | Mod: CPTII,S$GLB,, | Performed by: SURGERY

## 2022-02-08 PROCEDURE — 3008F PR BODY MASS INDEX (BMI) DOCUMENTED: ICD-10-PCS | Mod: CPTII,S$GLB,, | Performed by: SURGERY

## 2022-02-08 PROCEDURE — 99214 PR OFFICE/OUTPT VISIT, EST, LEVL IV, 30-39 MIN: ICD-10-PCS | Mod: S$GLB,,, | Performed by: SURGERY

## 2022-02-08 PROCEDURE — 99999 PR PBB SHADOW E&M-EST. PATIENT-LVL IV: ICD-10-PCS | Mod: PBBFAC,,, | Performed by: SURGERY

## 2022-02-08 PROCEDURE — 99999 PR PBB SHADOW E&M-EST. PATIENT-LVL IV: CPT | Mod: PBBFAC,,, | Performed by: SURGERY

## 2022-02-08 PROCEDURE — 99214 OFFICE O/P EST MOD 30 MIN: CPT | Mod: S$GLB,,, | Performed by: SURGERY

## 2022-02-08 PROCEDURE — 1159F PR MEDICATION LIST DOCUMENTED IN MEDICAL RECORD: ICD-10-PCS | Mod: CPTII,S$GLB,, | Performed by: SURGERY

## 2022-02-08 PROCEDURE — 1159F MED LIST DOCD IN RCRD: CPT | Mod: CPTII,S$GLB,, | Performed by: SURGERY

## 2022-02-08 PROCEDURE — 1160F PR REVIEW ALL MEDS BY PRESCRIBER/CLIN PHARMACIST DOCUMENTED: ICD-10-PCS | Mod: CPTII,S$GLB,, | Performed by: SURGERY

## 2022-02-08 PROCEDURE — 3078F PR MOST RECENT DIASTOLIC BLOOD PRESSURE < 80 MM HG: ICD-10-PCS | Mod: CPTII,S$GLB,, | Performed by: SURGERY

## 2022-02-08 PROCEDURE — 3008F BODY MASS INDEX DOCD: CPT | Mod: CPTII,S$GLB,, | Performed by: SURGERY

## 2022-02-08 PROCEDURE — 3078F DIAST BP <80 MM HG: CPT | Mod: CPTII,S$GLB,, | Performed by: SURGERY

## 2022-02-08 PROCEDURE — 3074F SYST BP LT 130 MM HG: CPT | Mod: CPTII,S$GLB,, | Performed by: SURGERY

## 2022-02-08 PROCEDURE — 3074F PR MOST RECENT SYSTOLIC BLOOD PRESSURE < 130 MM HG: ICD-10-PCS | Mod: CPTII,S$GLB,, | Performed by: SURGERY

## 2022-02-08 NOTE — Clinical Note
Hi Dr. Parra,   I am seeing Arely for her enlarging fibroadenoma of the right breast.  She has opted for an excisional biopsy to remove the mass.  Prior biopsy was benign and is likely a fibroadenoma. I will update you on her progress.   Thanks for sending her! Ina Hill MD Breast Surgical Oncology

## 2022-02-09 ENCOUNTER — OFFICE VISIT (OUTPATIENT)
Dept: PSYCHIATRY | Facility: CLINIC | Age: 27
End: 2022-02-09
Payer: COMMERCIAL

## 2022-02-09 ENCOUNTER — PATIENT MESSAGE (OUTPATIENT)
Dept: PSYCHIATRY | Facility: CLINIC | Age: 27
End: 2022-02-09

## 2022-02-09 VITALS — BODY MASS INDEX: 26.78 KG/M2 | HEART RATE: 96 BPM | WEIGHT: 156 LBS

## 2022-02-09 DIAGNOSIS — F41.0 PANIC ATTACKS: ICD-10-CM

## 2022-02-09 DIAGNOSIS — F34.1 PERSISTENT DEPRESSIVE DISORDER: ICD-10-CM

## 2022-02-09 DIAGNOSIS — F40.10 SOCIAL ANXIETY DISORDER: Primary | ICD-10-CM

## 2022-02-09 DIAGNOSIS — Z86.59 HISTORY OF ADHD: ICD-10-CM

## 2022-02-09 PROCEDURE — 1159F PR MEDICATION LIST DOCUMENTED IN MEDICAL RECORD: ICD-10-PCS | Mod: CPTII,95,, | Performed by: PSYCHIATRY & NEUROLOGY

## 2022-02-09 PROCEDURE — 99214 OFFICE O/P EST MOD 30 MIN: CPT | Mod: 95,,, | Performed by: PSYCHIATRY & NEUROLOGY

## 2022-02-09 PROCEDURE — 1160F RVW MEDS BY RX/DR IN RCRD: CPT | Mod: CPTII,95,, | Performed by: PSYCHIATRY & NEUROLOGY

## 2022-02-09 PROCEDURE — 3008F BODY MASS INDEX DOCD: CPT | Mod: CPTII,95,, | Performed by: PSYCHIATRY & NEUROLOGY

## 2022-02-09 PROCEDURE — 99214 PR OFFICE/OUTPT VISIT, EST, LEVL IV, 30-39 MIN: ICD-10-PCS | Mod: 95,,, | Performed by: PSYCHIATRY & NEUROLOGY

## 2022-02-09 PROCEDURE — 1160F PR REVIEW ALL MEDS BY PRESCRIBER/CLIN PHARMACIST DOCUMENTED: ICD-10-PCS | Mod: CPTII,95,, | Performed by: PSYCHIATRY & NEUROLOGY

## 2022-02-09 PROCEDURE — 1159F MED LIST DOCD IN RCRD: CPT | Mod: CPTII,95,, | Performed by: PSYCHIATRY & NEUROLOGY

## 2022-02-09 PROCEDURE — 3008F PR BODY MASS INDEX (BMI) DOCUMENTED: ICD-10-PCS | Mod: CPTII,95,, | Performed by: PSYCHIATRY & NEUROLOGY

## 2022-02-09 NOTE — PROGRESS NOTES
"Subsequent Psychiatric Session- VIRTUAL  26 y.o. female    Reason for Follow Up:   Persistent depressive disorder, social anxiety, panic attacks, history of ADHD    Current Medications:    Current Outpatient Medications:     amitriptyline (ELAVIL) 50 MG tablet, Take 50 mg by mouth every evening., Disp: , Rfl:     buPROPion (WELLBUTRIN XL) 150 MG TB24 tablet, Take 1 tablet (150 mg total) by mouth every morning., Disp: 30 tablet, Rfl: 6    ETONOGESTREL (IMPLANON SDRM), by Subdermal route., Disp: , Rfl:     lamoTRIgine (LAMICTAL) 25 MG tablet, Take 1 tablet (25 mg total) by mouth once daily., Disp: 42 tablet, Rfl: 0    vitamin D (VITAMIN D3) 1000 units Tab, Take 1,000 Units by mouth 2 (two) times a day., Disp: , Rfl:      Date of Last Visit:   01/28/22    In Clinic Since:   January 2022  Therapy:    Brielle Jaed    Interval History  Patient describes current mood as "a little better." She hasn't been as stressed. She has been working for [employer]. She journals on her laptop. Denies medication side effects, including rashes. Denies SI, HI, AVH.    Manic/Hypomanic Symptom Screening:  Since the last session, have you had any periods lasting at least a few days where your energy level was higher than normal and you did not need as much sleep at night to function the following day?: Denies    Substance Use Screening:  Alcohol: 0/30 days  Cannabis: This weekend marijuana  Anything recreational that is not prescribed: Denies    Review of Measures  PHQ-9: 22  SUSAN-7: 16    Scores from last session:  PHQ9 Score:              18/27   GAD7 Score:              16/21    Review of PDMP  Medical marijuana last filled 02/09/22    Constitutional     VITAL SIGNS  Temp     BP      HR 96    RR      SpO2           No results found for this or any previous visit (from the past 672 hour(s)).     MENTAL STATUS EXAM  General:  Alert and oriented x 4 Appearance is good grooming and hygiene, appears stated age, BMI 26.78. Behavior: " "friendly and cooperative, eye contact normal.  Gait, Posture and Muscle Strength/Tone: Normal posture observed. No gross abnormal movements noted.  Psychomotor Agitation and Retardation: none evident  Speech: Normal rate, volume, articulation, and tone.  Mood: "a little better"  Affect: anxious  Thought Process: Linear and coherent. No flight of ideas.  Associations:  Intact. No loosening of associations.  Thought content: Denies suicidal and homicidal thoughts, plans and intentions.  Perceptions: Denies any auditory or visual hallucinations. Does not appear internally preoccupied.  Orientation: Alert and oriented to person, place, date and situation  Attention and Concentration: Intact.   Memory: Intact grossly   Language: No deficits noted   Fund of Knowledge: appropriate for age and level and education.   Insight:   good  Judgment: good  Impulse control: good      ASSESSMENT  Problem List Items Addressed This Visit        Psychiatric    Social anxiety disorder - Primary    Persistent depressive disorder    History of ADHD    Panic attacks             PLAN  Patient Instructions   1. Continue Wellbutrin XL 150mg daily.  2. When financially possible, resume therapy  3. Continue lamictal 25mg daily until the 12th. Then, increase to 50mg daily until the 26th, after which increase to 100mg daily. Message to report any new rashes.    Follow up in May 2022. Or call the office if you would like to be seen by another provider temporarily.      -------------------------------------------------------------------------------    Demetria Amezcua  [facility] - PSYCHIATRY    Due to the pandemic, today's session was performed over [facility]. Patient is confirmed to be in the [facility]. The participants are patient and myself.    Today's encounter took a total time of 30 minutes, and that time included patient interview and documentation    Risks, Benefits, Side Effects and Alternatives were discussed with the patient today and " consent was obtained for the current medication regimen. The patient was encouraged to ask questions and these questions were answered and the patient was engaged in psychoeducation regarding diagnosis, course of illness, accuracy of the diagnosis, and other general elements regarding overall diagnosis and treatment consideration.     Any medications being used off-label were discussed with the patient inclusive of the evidence base for the use of the medications and consent was obtained for the off-label use of the medication.     Brief suicide risk assessment was performed with the patient today and safety planning was performed if indicated.    Note completed by: Demetria Amezcua MD, 2/15/2022 9:13 AM

## 2022-02-15 ENCOUNTER — PATIENT MESSAGE (OUTPATIENT)
Dept: PSYCHIATRY | Facility: CLINIC | Age: 27
End: 2022-02-15
Payer: COMMERCIAL

## 2022-02-15 RX ORDER — LAMOTRIGINE 100 MG/1
100 TABLET ORAL DAILY
Qty: 30 TABLET | Refills: 4 | Status: SHIPPED | OUTPATIENT
Start: 2022-02-26 | End: 2022-05-04 | Stop reason: SDUPTHER

## 2022-02-16 NOTE — PATIENT INSTRUCTIONS
1. Continue Wellbutrin XL 150mg daily.  2. When financially possible, resume therapy  3. Continue lamictal 25mg daily until the 12th. Then, increase to 50mg daily until the 26th, after which increase to 100mg daily. Message to report any new rashes.    Follow up in May 2022. Or call the office if you would like to be seen by another provider temporarily.

## 2022-03-08 ENCOUNTER — ANESTHESIA EVENT (OUTPATIENT)
Dept: SURGERY | Facility: OTHER | Age: 27
End: 2022-03-08
Payer: COMMERCIAL

## 2022-03-08 ENCOUNTER — HOSPITAL ENCOUNTER (OUTPATIENT)
Dept: PREADMISSION TESTING | Facility: OTHER | Age: 27
Discharge: HOME | End: 2022-03-08
Attending: SURGERY
Payer: COMMERCIAL

## 2022-03-08 VITALS
DIASTOLIC BLOOD PRESSURE: 74 MMHG | OXYGEN SATURATION: 96 % | SYSTOLIC BLOOD PRESSURE: 129 MMHG | WEIGHT: 155 LBS | TEMPERATURE: 99 F | HEART RATE: 96 BPM | BODY MASS INDEX: 26.61 KG/M2

## 2022-03-08 DIAGNOSIS — Z01.818 PRE-OP TESTING: ICD-10-CM

## 2022-03-08 LAB
ALBUMIN SERPL BCP-MCNC: 4.2 G/DL (ref 3.5–5.2)
ALP SERPL-CCNC: 46 U/L (ref 55–135)
ALT SERPL W/O P-5'-P-CCNC: 19 U/L (ref 10–44)
ANION GAP SERPL CALC-SCNC: 8 MMOL/L (ref 8–16)
AST SERPL-CCNC: 16 U/L (ref 10–40)
BASOPHILS # BLD AUTO: 0.04 K/UL (ref 0–0.2)
BASOPHILS NFR BLD: 0.7 % (ref 0–1.9)
BILIRUB SERPL-MCNC: 0.2 MG/DL (ref 0.1–1)
BUN SERPL-MCNC: 16 MG/DL (ref 6–20)
CALCIUM SERPL-MCNC: 9.8 MG/DL (ref 8.7–10.5)
CHLORIDE SERPL-SCNC: 106 MMOL/L (ref 95–110)
CO2 SERPL-SCNC: 27 MMOL/L (ref 23–29)
CREAT SERPL-MCNC: 0.8 MG/DL (ref 0.5–1.4)
DIFFERENTIAL METHOD: NORMAL
EOSINOPHIL # BLD AUTO: 0 K/UL (ref 0–0.5)
EOSINOPHIL NFR BLD: 0 % (ref 0–8)
ERYTHROCYTE [DISTWIDTH] IN BLOOD BY AUTOMATED COUNT: 12.6 % (ref 11.5–14.5)
EST. GFR  (AFRICAN AMERICAN): >60 ML/MIN/1.73 M^2
EST. GFR  (NON AFRICAN AMERICAN): >60 ML/MIN/1.73 M^2
GLUCOSE SERPL-MCNC: 90 MG/DL (ref 70–110)
HCT VFR BLD AUTO: 38.8 % (ref 37–48.5)
HGB BLD-MCNC: 12.9 G/DL (ref 12–16)
IMM GRANULOCYTES # BLD AUTO: 0.02 K/UL (ref 0–0.04)
IMM GRANULOCYTES NFR BLD AUTO: 0.4 % (ref 0–0.5)
LYMPHOCYTES # BLD AUTO: 1.6 K/UL (ref 1–4.8)
LYMPHOCYTES NFR BLD: 28.8 % (ref 18–48)
MCH RBC QN AUTO: 29.6 PG (ref 27–31)
MCHC RBC AUTO-ENTMCNC: 33.2 G/DL (ref 32–36)
MCV RBC AUTO: 89 FL (ref 82–98)
MONOCYTES # BLD AUTO: 0.6 K/UL (ref 0.3–1)
MONOCYTES NFR BLD: 9.9 % (ref 4–15)
NEUTROPHILS # BLD AUTO: 3.3 K/UL (ref 1.8–7.7)
NEUTROPHILS NFR BLD: 60.2 % (ref 38–73)
NRBC BLD-RTO: 0 /100 WBC
PLATELET # BLD AUTO: 225 K/UL (ref 150–450)
PMV BLD AUTO: 10.3 FL (ref 9.2–12.9)
POTASSIUM SERPL-SCNC: 4.5 MMOL/L (ref 3.5–5.1)
PROT SERPL-MCNC: 6.8 G/DL (ref 6–8.4)
RBC # BLD AUTO: 4.36 M/UL (ref 4–5.4)
SODIUM SERPL-SCNC: 141 MMOL/L (ref 136–145)
WBC # BLD AUTO: 5.53 K/UL (ref 3.9–12.7)

## 2022-03-08 PROCEDURE — 85025 COMPLETE CBC W/AUTO DIFF WBC: CPT | Performed by: SURGERY

## 2022-03-08 PROCEDURE — 36415 COLL VENOUS BLD VENIPUNCTURE: CPT | Performed by: SURGERY

## 2022-03-08 PROCEDURE — 80053 COMPREHEN METABOLIC PANEL: CPT | Performed by: SURGERY

## 2022-03-08 RX ORDER — SODIUM CHLORIDE, SODIUM LACTATE, POTASSIUM CHLORIDE, CALCIUM CHLORIDE 600; 310; 30; 20 MG/100ML; MG/100ML; MG/100ML; MG/100ML
INJECTION, SOLUTION INTRAVENOUS CONTINUOUS
Status: CANCELLED | OUTPATIENT
Start: 2022-03-08

## 2022-03-08 RX ORDER — LIDOCAINE HYDROCHLORIDE 10 MG/ML
0.5 INJECTION, SOLUTION EPIDURAL; INFILTRATION; INTRACAUDAL; PERINEURAL ONCE
Status: CANCELLED | OUTPATIENT
Start: 2022-03-08 | End: 2022-03-08

## 2022-03-08 NOTE — DISCHARGE INSTRUCTIONS
Information to Prepare you for your Surgery    PRE-ADMIT TESTING -  [phone]    [address] [facility]          Your surgery has been scheduled at [facility]. We are pleased to have the opportunity to serve you. For Further Information please call [phone].    On the day of surgery please report to the Information Desk on the 1st floor.    CONTACT YOUR PHYSICIAN'S OFFICE THE DAY PRIOR TO YOUR SURGERY TO OBTAIN YOUR ARRIVAL TIME.     The evening before surgery do not eat anything after 9 p.m. ( this includes hard candy, chewing gum and mints).  You may only have GATORADE, POWERADE AND WATER  from 9 p.m. until you leave your home.   DO NOT DRINK ANY LIQUIDS ON THE WAY TO THE HOSPITAL.      Why does your anesthesiologist allow you to drink Gatorade/Powerade before surgery?  Gatorade/Powerade helps to increase your comfort before surgery and to decrease your nausea after surgery. The carbohydrates in Gatorade/Powerade help reduce your body's stress response to surgery.  If you are a diabetic-drink only water prior to surgery.      Current Visitor policy(12/27/2021) - Patients may have 1 adult visitor pre and post procedure. Only 1 visitor will be allowed in the Surgical building with the patient.     SPECIAL MEDICATION INSTRUCTIONS: TAKE medications checked off by the Anesthesiologist on your Medication List.    Angiogram Patients: Take medications as instructed by your physician, including aspirin.     Surgery Patients:    If you take ASPIRIN - Your PHYSICIAN/SURGEON will need to inform you IF/OR when you need to stop taking aspirin prior to your surgery.     Do Not take any medications containing IBUPROFEN.    Do Not Wear any make-up (especially eye make-up) to surgery. Please remove any false eyelashes or eyelash extensions. If you arrive the day of surgery with makeup/eyelashes on you will be required to remove prior to surgery. (There is a risk of  corneal abrasions if eye makeup/eyelash extensions are not removed)      Leave all valuables at home.   Do Not wear any jewelry or watches, including any metal in body piercings. Jewelry must be removed prior to coming to the hospital.  There is a possibility that rings that are unable to be removed may be cut off if they are on the surgical extremity.    Please remove all hair extensions, wigs, clips and any other metal accessories/ ornaments from your hair.  These items may pose a flammable/fire risk in Surgery and must be removed.    Do not shave your surgical area at least 5 days prior to your surgery. The surgical prep will be performed at the hospital according to Infection Control regulations.    Contact Lens must be removed before surgery. Either do not wear the contact lens or bring a case and solution for storage.  Please bring a container for eyeglasses or dentures as required.  Bring any paperwork your physician has provided, such as consent forms,  history and physicals, doctor's orders, etc.   Bring comfortable clothes that are loose fitting to wear upon discharge. Take into consideration the type of surgery being performed.  Maintain your diet as advised per your physician the day prior to surgery.      Adequate rest the night before surgery is advised.   Park in the Parking lot behind the hospital or in the [employer] [facility] across the street from the parking lot. Parking is complimentary.  If you will be discharged the same day as your procedure, please arrange for a responsible adult to drive you home or to accompany you if traveling by taxi.   YOU WILL NOT BE PERMITTED TO DRIVE OR TO LEAVE THE HOSPITAL ALONE AFTER SURGERY.   If you are being discharged the same day, it is strongly recommended that you arrange for someone to remain with you for the first 24 hrs following your surgery.    The Surgeon will speak to your family/visitor after your surgery regarding the outcome of your surgery and  post op care.  The Surgeon may speak to you after your surgery, but there is a possibility you may not remember the details.  Please check with your family members regarding the conversation with the Surgeon.    We strongly recommend whoever is bringing you home be present for discharge instructions.  This will ensure a thorough understanding for your post op home care.    ALL CHILDREN MUST ALWAYS BE ACCOMPANIED BY AN ADULT.    Visitors-Refer to current Visitor policy handouts.    Thank you for your cooperation.  The Staff of [facility].            Bathing Instructions with Hibiclens    Shower the evening before and morning of your procedure with Hibiclens:  Wash your face with water and your regular face wash/soap  Apply Hibiclens directly on your skin or on a wet washcloth and wash gently. When showering: Move away from the shower stream when applying Hibiclens to avoid rinsing off too soon.  Rinse thoroughly with warm water  Do not dilute Hibiclens        Dry off as usual, do not use any deodorant, powder, body lotions, perfume, after shave or cologne.

## 2022-03-08 NOTE — ANESTHESIA PREPROCEDURE EVALUATION
03/08/2022  Arely Valles is a 26 y.o., female.      Pre-op Assessment    I have reviewed the Patient Summary Reports.     I have reviewed the Nursing Notes. I have reviewed the NPO Status.   I have reviewed the Medications.     Review of Systems  Anesthesia Hx:  No problems with previous Anesthesia  Denies Family Hx of Anesthesia complications.   Denies Personal Hx of Anesthesia complications.   Social:  Non-Smoker    Hematology/Oncology:  Hematology Normal   Oncology Normal     EENT/Dental:EENT/Dental Normal   Cardiovascular:   Exercise tolerance: good    Pulmonary:  Pulmonary Normal    Renal/:  Renal/ Normal     Hepatic/GI:  Hepatic/GI Normal    Musculoskeletal:  Musculoskeletal Normal    Neurological:   Fibromyalgia   Psych:   Psychiatric History anxiety depression ADHD         Physical Exam  General: Well nourished, Cooperative, Alert and Oriented    Airway:  Mallampati: I   Mouth Opening: Normal  TM Distance: Normal  Tongue: Normal  Neck ROM: Normal ROM    Dental:  Intact, Retainer        Anesthesia Plan  Type of Anesthesia, risks & benefits discussed:    Anesthesia Type: Gen ETT  Intra-op Monitoring Plan: Standard ASA Monitors  Post Op Pain Control Plan: multimodal analgesia  Induction:  IV  Airway Plan: Video, Post-Induction  ASA Score: 2  Anesthesia Plan Notes: Lab today    Ready For Surgery From Anesthesia Perspective.     .

## 2022-03-09 ENCOUNTER — PATIENT MESSAGE (OUTPATIENT)
Dept: ENDOCRINOLOGY | Facility: CLINIC | Age: 27
End: 2022-03-09
Payer: COMMERCIAL

## 2022-03-15 ENCOUNTER — LAB VISIT (OUTPATIENT)
Dept: PRIMARY CARE CLINIC | Facility: CLINIC | Age: 27
End: 2022-03-15
Payer: COMMERCIAL

## 2022-03-15 DIAGNOSIS — Z01.818 PRE-OP TESTING: ICD-10-CM

## 2022-03-15 PROCEDURE — U0005 INFEC AGEN DETEC AMPLI PROBE: HCPCS | Performed by: SURGERY

## 2022-03-15 PROCEDURE — U0003 INFECTIOUS AGENT DETECTION BY NUCLEIC ACID (DNA OR RNA); SEVERE ACUTE RESPIRATORY SYNDROME CORONAVIRUS 2 (SARS-COV-2) (CORONAVIRUS DISEASE [COVID-19]), AMPLIFIED PROBE TECHNIQUE, MAKING USE OF HIGH THROUGHPUT TECHNOLOGIES AS DESCRIBED BY CMS-2020-01-R: HCPCS | Performed by: SURGERY

## 2022-03-16 LAB
SARS-COV-2 RNA RESP QL NAA+PROBE: NOT DETECTED
SARS-COV-2- CYCLE NUMBER: NORMAL

## 2022-03-17 ENCOUNTER — TELEPHONE (OUTPATIENT)
Dept: SURGERY | Facility: CLINIC | Age: 27
End: 2022-03-17
Payer: COMMERCIAL

## 2022-03-17 NOTE — TELEPHONE ENCOUNTER
Patient contacted regarding surgery arrival time, location and instructions for tomorrow. NPO reinforced. All questions answered and patient verbalized understanding.

## 2022-03-18 ENCOUNTER — ANESTHESIA (OUTPATIENT)
Dept: SURGERY | Facility: OTHER | Age: 27
End: 2022-03-18
Payer: COMMERCIAL

## 2022-03-18 ENCOUNTER — HOSPITAL ENCOUNTER (OUTPATIENT)
Facility: OTHER | Age: 27
Discharge: HOME | End: 2022-03-18
Attending: SURGERY | Admitting: SURGERY
Payer: COMMERCIAL

## 2022-03-18 DIAGNOSIS — N63.10 BREAST MASS, RIGHT: Primary | ICD-10-CM

## 2022-03-18 LAB
B-HCG UR QL: NEGATIVE
CTP QC/QA: YES

## 2022-03-18 PROCEDURE — 71000033 HC RECOVERY, INTIAL HOUR: Performed by: SURGERY

## 2022-03-18 PROCEDURE — 37000009 HC ANESTHESIA EA ADD 15 MINS: Performed by: SURGERY

## 2022-03-18 PROCEDURE — 25000003 PHARM REV CODE 250: Performed by: SURGERY

## 2022-03-18 PROCEDURE — 71000016 HC POSTOP RECOV ADDL HR: Performed by: SURGERY

## 2022-03-18 PROCEDURE — 88307 TISSUE EXAM BY PATHOLOGIST: CPT | Performed by: PATHOLOGY

## 2022-03-18 PROCEDURE — 19120 REMOVAL OF BREAST LESION: CPT | Mod: RT,,, | Performed by: SURGERY

## 2022-03-18 PROCEDURE — 19120 PR EXCISE BREAST CYST: ICD-10-PCS | Mod: RT,,, | Performed by: SURGERY

## 2022-03-18 PROCEDURE — 36000707: Performed by: SURGERY

## 2022-03-18 PROCEDURE — 88307 TISSUE EXAM BY PATHOLOGIST: CPT | Mod: 26,,, | Performed by: PATHOLOGY

## 2022-03-18 PROCEDURE — 36000706: Performed by: SURGERY

## 2022-03-18 PROCEDURE — 25000003 PHARM REV CODE 250

## 2022-03-18 PROCEDURE — 71000015 HC POSTOP RECOV 1ST HR: Performed by: SURGERY

## 2022-03-18 PROCEDURE — 88307 PR  SURG PATH,LEVEL V: ICD-10-PCS | Mod: 26,,, | Performed by: PATHOLOGY

## 2022-03-18 PROCEDURE — 37000008 HC ANESTHESIA 1ST 15 MINUTES: Performed by: SURGERY

## 2022-03-18 PROCEDURE — 81025 URINE PREGNANCY TEST: CPT | Performed by: ANESTHESIOLOGY

## 2022-03-18 PROCEDURE — 63600175 PHARM REV CODE 636 W HCPCS: Performed by: STUDENT IN AN ORGANIZED HEALTH CARE EDUCATION/TRAINING PROGRAM

## 2022-03-18 PROCEDURE — 63600175 PHARM REV CODE 636 W HCPCS

## 2022-03-18 RX ORDER — MORPHINE SULFATE 2 MG/ML
2 INJECTION, SOLUTION INTRAMUSCULAR; INTRAVENOUS
Status: DISCONTINUED | OUTPATIENT
Start: 2022-03-18 | End: 2022-03-18 | Stop reason: HOSPADM

## 2022-03-18 RX ORDER — LIDOCAINE HYDROCHLORIDE 10 MG/ML
0.5 INJECTION, SOLUTION EPIDURAL; INFILTRATION; INTRACAUDAL; PERINEURAL ONCE
Status: DISCONTINUED | OUTPATIENT
Start: 2022-03-18 | End: 2022-03-18 | Stop reason: HOSPADM

## 2022-03-18 RX ORDER — HYDROMORPHONE HYDROCHLORIDE 2 MG/ML
0.4 INJECTION, SOLUTION INTRAMUSCULAR; INTRAVENOUS; SUBCUTANEOUS EVERY 5 MIN PRN
Status: DISCONTINUED | OUTPATIENT
Start: 2022-03-18 | End: 2022-03-18 | Stop reason: HOSPADM

## 2022-03-18 RX ORDER — SODIUM CHLORIDE 0.9 % (FLUSH) 0.9 %
3 SYRINGE (ML) INJECTION
Status: DISCONTINUED | OUTPATIENT
Start: 2022-03-18 | End: 2022-03-18 | Stop reason: HOSPADM

## 2022-03-18 RX ORDER — OXYCODONE HYDROCHLORIDE 5 MG/1
5 TABLET ORAL
Status: DISCONTINUED | OUTPATIENT
Start: 2022-03-18 | End: 2022-03-18 | Stop reason: HOSPADM

## 2022-03-18 RX ORDER — DEXAMETHASONE SODIUM PHOSPHATE 4 MG/ML
INJECTION, SOLUTION INTRA-ARTICULAR; INTRALESIONAL; INTRAMUSCULAR; INTRAVENOUS; SOFT TISSUE
Status: DISCONTINUED | OUTPATIENT
Start: 2022-03-18 | End: 2022-03-18

## 2022-03-18 RX ORDER — CEFAZOLIN SODIUM 1 G/3ML
2 INJECTION, POWDER, FOR SOLUTION INTRAMUSCULAR; INTRAVENOUS
Status: COMPLETED | OUTPATIENT
Start: 2022-03-18 | End: 2022-03-18

## 2022-03-18 RX ORDER — SODIUM CHLORIDE, SODIUM LACTATE, POTASSIUM CHLORIDE, CALCIUM CHLORIDE 600; 310; 30; 20 MG/100ML; MG/100ML; MG/100ML; MG/100ML
INJECTION, SOLUTION INTRAVENOUS CONTINUOUS
Status: DISCONTINUED | OUTPATIENT
Start: 2022-03-18 | End: 2022-03-18 | Stop reason: HOSPADM

## 2022-03-18 RX ORDER — ONDANSETRON 2 MG/ML
INJECTION INTRAMUSCULAR; INTRAVENOUS
Status: DISCONTINUED | OUTPATIENT
Start: 2022-03-18 | End: 2022-03-18

## 2022-03-18 RX ORDER — BUPIVACAINE HYDROCHLORIDE 2.5 MG/ML
INJECTION, SOLUTION EPIDURAL; INFILTRATION; INTRACAUDAL
Status: DISCONTINUED | OUTPATIENT
Start: 2022-03-18 | End: 2022-03-18 | Stop reason: HOSPADM

## 2022-03-18 RX ORDER — FENTANYL CITRATE 50 UG/ML
INJECTION, SOLUTION INTRAMUSCULAR; INTRAVENOUS
Status: DISCONTINUED | OUTPATIENT
Start: 2022-03-18 | End: 2022-03-18

## 2022-03-18 RX ORDER — HYDROCODONE BITARTRATE AND ACETAMINOPHEN 5; 325 MG/1; MG/1
1 TABLET ORAL EVERY 6 HOURS PRN
Qty: 10 TABLET | Refills: 0 | Status: SHIPPED | OUTPATIENT
Start: 2022-03-18 | End: 2022-03-18 | Stop reason: HOSPADM

## 2022-03-18 RX ORDER — PROCHLORPERAZINE EDISYLATE 5 MG/ML
5 INJECTION INTRAMUSCULAR; INTRAVENOUS EVERY 30 MIN PRN
Status: DISCONTINUED | OUTPATIENT
Start: 2022-03-18 | End: 2022-03-18 | Stop reason: HOSPADM

## 2022-03-18 RX ORDER — KETOROLAC TROMETHAMINE 30 MG/ML
INJECTION, SOLUTION INTRAMUSCULAR; INTRAVENOUS
Status: DISCONTINUED | OUTPATIENT
Start: 2022-03-18 | End: 2022-03-18

## 2022-03-18 RX ORDER — TRAMADOL HYDROCHLORIDE 50 MG/1
50 TABLET ORAL EVERY 6 HOURS PRN
Qty: 10 TABLET | Refills: 0 | Status: SHIPPED | OUTPATIENT
Start: 2022-03-18

## 2022-03-18 RX ORDER — LIDOCAINE HYDROCHLORIDE 20 MG/ML
INJECTION INTRAVENOUS
Status: DISCONTINUED | OUTPATIENT
Start: 2022-03-18 | End: 2022-03-18

## 2022-03-18 RX ORDER — DIPHENHYDRAMINE HYDROCHLORIDE 50 MG/ML
12.5 INJECTION INTRAMUSCULAR; INTRAVENOUS EVERY 30 MIN PRN
Status: DISCONTINUED | OUTPATIENT
Start: 2022-03-18 | End: 2022-03-18 | Stop reason: HOSPADM

## 2022-03-18 RX ORDER — ONDANSETRON 8 MG/1
8 TABLET, ORALLY DISINTEGRATING ORAL EVERY 8 HOURS PRN
Status: DISCONTINUED | OUTPATIENT
Start: 2022-03-18 | End: 2022-03-18 | Stop reason: HOSPADM

## 2022-03-18 RX ORDER — DEXMEDETOMIDINE HYDROCHLORIDE 100 UG/ML
INJECTION, SOLUTION INTRAVENOUS
Status: DISCONTINUED | OUTPATIENT
Start: 2022-03-18 | End: 2022-03-18

## 2022-03-18 RX ORDER — PROPOFOL 10 MG/ML
VIAL (ML) INTRAVENOUS
Status: DISCONTINUED | OUTPATIENT
Start: 2022-03-18 | End: 2022-03-18

## 2022-03-18 RX ORDER — SODIUM CHLORIDE 9 MG/ML
INJECTION, SOLUTION INTRAVENOUS CONTINUOUS
Status: DISCONTINUED | OUTPATIENT
Start: 2022-03-18 | End: 2022-03-18 | Stop reason: HOSPADM

## 2022-03-18 RX ORDER — MIDAZOLAM HYDROCHLORIDE 1 MG/ML
INJECTION INTRAMUSCULAR; INTRAVENOUS
Status: DISCONTINUED | OUTPATIENT
Start: 2022-03-18 | End: 2022-03-18

## 2022-03-18 RX ORDER — MEPERIDINE HYDROCHLORIDE 25 MG/ML
12.5 INJECTION INTRAMUSCULAR; INTRAVENOUS; SUBCUTANEOUS ONCE AS NEEDED
Status: DISCONTINUED | OUTPATIENT
Start: 2022-03-18 | End: 2022-03-18 | Stop reason: HOSPADM

## 2022-03-18 RX ADMIN — FENTANYL CITRATE 50 MCG: 50 INJECTION, SOLUTION INTRAMUSCULAR; INTRAVENOUS at 02:03

## 2022-03-18 RX ADMIN — KETOROLAC TROMETHAMINE 30 MG: 30 INJECTION, SOLUTION INTRAMUSCULAR; INTRAVENOUS at 02:03

## 2022-03-18 RX ADMIN — SODIUM CHLORIDE, SODIUM LACTATE, POTASSIUM CHLORIDE, AND CALCIUM CHLORIDE: .6; .31; .03; .02 INJECTION, SOLUTION INTRAVENOUS at 01:03

## 2022-03-18 RX ADMIN — GLYCOPYRROLATE 0.2 MG: 0.2 INJECTION, SOLUTION INTRAMUSCULAR; INTRAVITREAL at 01:03

## 2022-03-18 RX ADMIN — SODIUM CHLORIDE, SODIUM LACTATE, POTASSIUM CHLORIDE, AND CALCIUM CHLORIDE: .6; .31; .03; .02 INJECTION, SOLUTION INTRAVENOUS at 02:03

## 2022-03-18 RX ADMIN — LIDOCAINE HYDROCHLORIDE 50 MG: 20 INJECTION, SOLUTION INTRAVENOUS at 02:03

## 2022-03-18 RX ADMIN — DEXAMETHASONE SODIUM PHOSPHATE 8 MG: 4 INJECTION, SOLUTION INTRAMUSCULAR; INTRAVENOUS at 01:03

## 2022-03-18 RX ADMIN — MIDAZOLAM HYDROCHLORIDE 2 MG: 1 INJECTION, SOLUTION INTRAMUSCULAR; INTRAVENOUS at 01:03

## 2022-03-18 RX ADMIN — CEFAZOLIN 2 G: 330 INJECTION, POWDER, FOR SOLUTION INTRAMUSCULAR; INTRAVENOUS at 02:03

## 2022-03-18 RX ADMIN — ONDANSETRON HYDROCHLORIDE 4 MG: 2 INJECTION INTRAMUSCULAR; INTRAVENOUS at 02:03

## 2022-03-18 RX ADMIN — FENTANYL CITRATE 25 MCG: 50 INJECTION, SOLUTION INTRAMUSCULAR; INTRAVENOUS at 02:03

## 2022-03-18 RX ADMIN — FENTANYL CITRATE 100 MCG: 50 INJECTION, SOLUTION INTRAMUSCULAR; INTRAVENOUS at 02:03

## 2022-03-18 RX ADMIN — FENTANYL CITRATE 25 MCG: 50 INJECTION, SOLUTION INTRAMUSCULAR; INTRAVENOUS at 03:03

## 2022-03-18 RX ADMIN — PROPOFOL 200 MG: 10 INJECTION, EMULSION INTRAVENOUS at 02:03

## 2022-03-18 RX ADMIN — ONDANSETRON 4 MG: 2 INJECTION INTRAMUSCULAR; INTRAVENOUS at 02:03

## 2022-03-18 RX ADMIN — CARBOXYMETHYLCELLULOSE SODIUM 2 DROP: 2.5 SOLUTION/ DROPS OPHTHALMIC at 02:03

## 2022-03-18 RX ADMIN — DEXMEDETOMIDINE HYDROCHLORIDE 10 MCG: 100 INJECTION, SOLUTION, CONCENTRATE INTRAVENOUS at 02:03

## 2022-03-18 NOTE — ANESTHESIA POSTPROCEDURE EVALUATION
Anesthesia Post Evaluation    Patient: Arely Valles    Procedure(s) Performed: Procedure(s) (LRB):  EXCISIONAL BIOPSY right with ultrasound (Right)    Final Anesthesia Type: general      Patient location during evaluation: PACU  Patient participation: Yes- Able to Participate  Level of consciousness: awake and alert  Post-procedure vital signs: reviewed and stable  Pain management: adequate  Airway patency: patent    PONV status at discharge: No PONV  Anesthetic complications: no      Cardiovascular status: blood pressure returned to baseline  Respiratory status: unassisted and room air  Hydration status: euvolemic  Follow-up not needed.          Vitals Value Taken Time   /57 03/18/22 1710   Temp 36.6 °C (97.8 °F) 03/18/22 1610   Pulse 70 03/18/22 1710   Resp 16 03/18/22 1710   SpO2 100 % 03/18/22 1710         Event Time   Out of Recovery 15:55:04         Pain/Sarah Score: Sarah Score: 10 (3/18/2022  5:10 PM)

## 2022-03-18 NOTE — ANESTHESIA PROCEDURE NOTES
Intubation    Date/Time: 3/18/2022 2:04 PM  Performed by: Seth Kumar CRNA  Authorized by: Bautista Fitzpatrick MD     Intubation:     Induction:  Intravenous    Intubated:  Postinduction    Mask Ventilation:  N/a    Attempts:  1    Attempted By:  CRNA    Difficult Airway Encountered?: No      Airway Device:  Supraglottic airway/LMA    Airway Device Size:  4.0 (igel)    Style/Cuff Inflation:  Cuffed (inflated to minimal occlusive pressure)    Placement Verified By:  Capnometry    Complicating Factors:  None    Findings Post-Intubation:  BS equal bilateral and atraumatic/condition of teeth unchanged

## 2022-03-18 NOTE — TRANSFER OF CARE
"Anesthesia Transfer of Care Note    Patient: Arely Valles    Procedure(s) Performed: Procedure(s) (LRB):  EXCISIONAL BIOPSY right with ultrasound (Right)    Patient location: PACU    Anesthesia Type: general    Transport from OR: Transported from OR on room air with adequate spontaneous ventilation    Post pain: adequate analgesia    Post assessment: no apparent anesthetic complications    Post vital signs: stable    Level of consciousness: awake, alert and oriented    Nausea/Vomiting: no nausea/vomiting    Complications: none    Transfer of care protocol was followed      Last vitals:   Visit Vitals  /70   Pulse 99   Temp 36.7 °C (98.1 °F) (Oral)   Resp 16   Ht 5' 4" (1.626 m)   Wt 70.3 kg (155 lb)   LMP 12/15/2021   SpO2 98%   Breastfeeding No   BMI 26.61 kg/m²     "

## 2022-03-18 NOTE — OP NOTE
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 female who presents with fibroadenoma  All the attendant risks, benefits, and potential complications of surgery were discussed with her and she wanted to proceed with surgery and consented to surgery.    PROCEDURE IN DETAIL:   The patient underwent informed consent.      She was then brought to the Operating Room and placed in the supine position. Anesthesia with local/general anesthesia care with sedation was administered.  The right breast, anterior chest, right arm and axilla were then prepped and draped in a sterile fashion.     We turned our attention to the right breast. Local anesthetic was injected in the area. An incision was made in the inframammary fold of the right breast over the anticipated tract of the lesion. The specimen was dissected circumferentially around the lesion.  The lesion localization lumpectomy specimen was marked using shot superior, Long inferior, lateral looped.      Within the lumpectomy cavity, hemostasis was achieved with cautery. The wound was irrigated until clear. There was no evidence of bleeding. It was closed in multiple layers with deep dermal and subcutaneous interrupted Vicryl sutures and a running 4-0 vicryl subcuticular skin closure.    Dermabond was applied. Sterile fluff gauze was placed and a post-procedure bra was placed. She tolerated the procedure well without complication and was turned over to Anesthesia for transport to the recovery area in a satisfactory condition. All specimens were sent to Pathology for permanent sectioning.    ESTIMATED BLOOD  LOSS:   Minimal    COMPLICATIONS: none    DISPOSITION:  PACU--hemodynamically stable

## 2022-03-18 NOTE — PATIENT INSTRUCTIONS
POSTOPERATIVE INSTRUCTIONS     The following are post-operative instructions that will help you to recover from your surgery.  Please read over these instructions carefully and contact us if we can answer any of your questions or concerns.    Dressing/breast binder (surgi-bra)  A surgical bra may be placed around your chest after your surgery.  If you are given the bra, please wear it as close to 24 hours a day as possible until your post-operative clinic appointment.  If the elastic around the bra irritates your skin, you may wear a soft t-shirt underneath the bra.    You may go without wearing the bra long enough to bath, to launder and dry the bra. If you have fluffy filler placed inside the bra, the filler should be removed whenever the bra is taken off. Please reinsert the fluffy filler, or insert the new soft filler, under the bra when you put the bra back on.  If the bra is extremely uncomfortable, you may wear a supportive sports bra instead after 2 days. Okay to remove the fluffy filler after 48 hours per comfort.    You may shower after 2 days.  Do not take a tub bath and do not soak the surgical site. Please do not remove the white strips of tape (steri-strips) that cover your incision.  They will be removed at your clinic visit.    Activity   You should be able to return to your regular activities 2 to 7 days after your surgery depending on your particular procedure.  However, do not engage in strenuous activities in which you use your upper body such as:  golf, tennis, aerobics, washing windows, raking the yard, mopping, vacuuming, heavy lifting (e.g children) until you are seen for your follow-up appointment in clinic.    Please report the following:    Temperature greater than 101 degrees  Discharge or bad odor from the wound  Excessive bleeding, such as bloody dressing or extreme bruising  Redness at incision and/or drain sites  Swelling or buildup of fluid around incision      Additional  information  I will see you approximately 2 weeks following your surgery.  If this follow-up appointment has not been made, please call the office.    If you have any questions or problems, please call my office or my nurse.    After hours and on weekends, you may call the main [facility] line at [phone] and ask to have the general surgery resident paged or have me paged.

## 2022-03-18 NOTE — H&P
[facility]  History and Physical           Subjective:      Patient ID: Arely Valles is a 26 y.o. female who presents with right breast mass. She originally discovered the mass when she was 18 years old and it has progressively grown since that time. She was followed clinically until 2019 when she was seen by LENKA Hanna 12/2019 for painful right breast mass. She had imaging done at [facility] on 11/15/2019 which showed a 3.6 cm lobulated mass with circumscribed margins at the 8 o'clock position of the right breast.  This was given a differential diagnosis of a phyllodes tumor versus a fibroadenoma with low suspicion for malignancy (BIRADS 4A). Patient was seen here at [facility] on 12/10/2019 by LENKA Hanna for second opinion. Imaging was reviewed by our team who also recommended US guided biopsy of the right breast mass. On 2/21/2020 US guided biopsy was performed which revealed Fibroadenoma, no atypia. At this time patient was not interested in surgical consultation. On 1/28/2022 patient had follow up right breast US which showed increased in size of previously biopsied palpable mass in the right breast to 4.5 cm (previously 3.6 cm); given BI-RADS 4A.         Patient does not routinely do self breast exams.  Patient has noted a change on breast exam.  Patient denies nipple discharge. Patient admits to previous breast biopsy. Patient denies a personal history of breast cancer. Prior history of angioedema in 2014 per EMR, patient unaware. Denies history of throat or neck swelling or allergic reactions requiring hospitalization. Reports history of asthma requiring PRN nebulizer therapy after exposure to tobacco smoke.     GYN History:  Age of menarche was 15.   Age of menopause was N/A.    Last menstrual period was January 2022.   Patient has hormonal therapy, nexplanon total of 9 year.   Patient is G0      Family History:  Breat Cancer: Paternal Grandmother, Breast Cancer Dx in 50s;  Maternal great aunt and 2nd cousin with Breast Cancer  Other Cancer: Paternal uncle, pancreatic cancer; cousin possible breast cancer, undergoing surgery tomorrow (Age: 20s); No colon, ovarian, or uterine cancer    Interval History:  No clinical changes since last visit.           Past Medical History:   Diagnosis Date    Angioedema 12/31/2014    AR (allergic rhinitis) 3/27/2013    Eczema              Past Surgical History:   Procedure Laterality Date    ADENOIDECTOMY        BREAST BIOPSY        SINUS SURGERY        TONSILLECTOMY                 Current Outpatient Medications on File Prior to Visit   Medication Sig Dispense Refill    amitriptyline (ELAVIL) 50 MG tablet Take 50 mg by mouth every evening.        buPROPion (WELLBUTRIN XL) 150 MG TB24 tablet Take 1 tablet (150 mg total) by mouth every morning. 30 tablet 6    ETONOGESTREL (IMPLANON SDRM) by Subdermal route.        lamoTRIgine (LAMICTAL) 25 MG tablet Take 1 tablet (25 mg total) by mouth once daily. 42 tablet 0    vitamin D (VITAMIN D3) 1000 units Tab Take 1,000 Units by mouth 2 (two) times a day.          No current facility-administered medications on file prior to visit.      Social History           Socioeconomic History    Marital status: Single   Tobacco Use    Smoking status: Current Every Day Smoker    Smokeless tobacco: Never Used   Substance and Sexual Activity    Alcohol use: No    Drug use: Not Currently            Family History   Problem Relation Age of Onset    Allergies Brother      Asthma Brother      Eczema Brother      Allergic rhinitis Brother      Breast cancer Paternal Grandmother      Allergic rhinitis Mother      No Known Problems Father      No Known Problems Sister      No Known Problems Maternal Aunt      No Known Problems Maternal Uncle      No Known Problems Paternal Aunt      No Known Problems Paternal Uncle      No Known Problems Maternal Grandmother      No Known Problems Maternal Grandfather    "   No Known Problems Paternal Grandfather      Colon cancer Neg Hx      Ovarian cancer Neg Hx      Angioedema Neg Hx      Atopy Neg Hx      Immunodeficiency Neg Hx      Rhinitis Neg Hx      Urticaria Neg Hx           Review of Systems   Constitutional: Negative for activity change, appetite change, chills, fever and unexpected weight change.   HENT:        Prior lymphadenopathy L cervical chain, posterior auricular nodes (> 5 years ago, but near the time of initial discovery of breast mass) managed with antibiotics. Has never recurred.    Respiratory: Negative for chest tightness and shortness of breath.    Cardiovascular: Negative for chest pain.   Gastrointestinal: Negative for abdominal pain, blood in stool, constipation, diarrhea and nausea.   Genitourinary: Negative for difficulty urinating and dysuria.   Musculoskeletal: Positive for arthralgias (Recent diagnosis of Fibromyalgia).   Neurological: Negative for seizures and numbness.      Objective:   /79 (BP Location: Right arm, Patient Position: Sitting, BP Method: Medium (Automatic))   Pulse 92   Ht 5' 4" (1.626 m)   Wt 70.8 kg (156 lb)   LMP 01/15/2022 (Approximate)   BMI 26.78 kg/m²      Physical Exam   Constitutional: She is oriented to person, place, and time. She appears well-developed and well-nourished. No distress.   HENT:   Head: Normocephalic and atraumatic.   Eyes: Conjunctivae and EOM are normal. No scleral icterus.   Cardiovascular: Normal rate and regular rhythm.    Pulmonary/Chest: Effort normal. No respiratory distress. Right breast exhibits mass (R breast mass 7 o'clock, 3 cm from nipple. Mobile. Measures approximately 5 cm by 3 cm in size). Right breast exhibits no inverted nipple, no nipple discharge, no skin change and no tenderness. Left breast exhibits no inverted nipple, no mass, no nipple discharge, no skin change and no tenderness. No breast swelling or bleeding. Breasts are asymmetrical.   Musculoskeletal: Normal " range of motion.   Lymphadenopathy:     She has no cervical adenopathy.   Neurological: She is alert and oriented to person, place, and time.   Skin: She is not diaphoretic.            Radiology review: Images personally reviewed by me in the clinic.   Ultrasound (1/28/22:  Impression:  Interval increased in size of a previously biopsied palpable oval hypoechoic circumscribed mass in the RIGHT breast with pathology demonstrating a fibroadenoma. Given interval increase in size, breast surgical consultation is recommended to discuss excision for definitive diagnosis and management.     BI-RADS Category:   Overall: 4A - Low Suspicion for Malignancy  Assessment:       1. Fibroadenoma of breast, right    2. Pre-op testing        Plan:      26 year-old female with a right breast fibroadenoma. To OR today for an excisional biopsy. Informed consent obtained.     -- Shannan Avalos M.D  General Surgery

## 2022-03-18 NOTE — BRIEF OP NOTE
[facility] - Surgery ([place])  Brief Operative Note    Surgery Date: 3/18/2022     Surgeon(s) and Role:     * Ina Hill MD - Primary    Assisting Surgeon: None    Pre-op Diagnosis:  Fibroadenoma of breast, right [D24.1]    Post-op Diagnosis:  Post-Op Diagnosis Codes:     * Fibroadenoma of breast, right [D24.1]    Procedure(s) (LRB):  EXCISIONAL BIOPSY right with ultrasound (Right)    Anesthesia: General    Operative Findings: Right breast excisional biopsy     Estimated Blood Loss: 5 cc         Specimens:   Specimen (24h ago, onward)             Start     Ordered    03/18/22 1449  Specimen to Pathology, Surgery Breast  Once        Comments: Pre-op Diagnosis: Fibroadenoma of breast, right [D24.1]    Procedure(s):  EXCISIONAL BIOPSY right with ultrasound     Number of specimens: 1    Name of specimens: 1.  Right breast excisional biopsy short superior long inferior loop lateral   References:    Click here for ordering Quick Tip   Question Answer Comment   Procedure Type: Breast    Specimen Class: Complex case/Special    Release to patient Immediate        03/18/22 1449                  Discharge Note    OUTCOME: Patient tolerated treatment/procedure well without complication and is now ready for discharge.    DISPOSITION: Home or Self Care    FINAL DIAGNOSIS:  Right breast mass    FOLLOWUP: In clinic    DISCHARGE INSTRUCTIONS:    Discharge Procedure Orders   Diet general     Call MD for:  temperature >100.4     Call MD for:  persistent nausea and vomiting     Call MD for:  severe uncontrolled pain     Call MD for:  redness, tenderness, or signs of infection (pain, swelling, redness, odor or green/yellow discharge around incision site)     No dressing needed   Order Comments: No heavy lifting for 4-6 weeks, activity as tolerated. Patient can shower, allow water to run over incisions. No soaking in bath or pools for 2 weeks. Do not pick at surgical glue, it will come off on its own.     Activity as tolerated

## 2022-03-21 VITALS
DIASTOLIC BLOOD PRESSURE: 57 MMHG | BODY MASS INDEX: 26.46 KG/M2 | HEART RATE: 70 BPM | WEIGHT: 155 LBS | OXYGEN SATURATION: 100 % | RESPIRATION RATE: 16 BRPM | TEMPERATURE: 98 F | SYSTOLIC BLOOD PRESSURE: 119 MMHG | HEIGHT: 64 IN

## 2022-03-25 LAB
FINAL PATHOLOGIC DIAGNOSIS: NORMAL
GROSS: NORMAL
Lab: NORMAL

## 2022-04-06 ENCOUNTER — OFFICE VISIT (OUTPATIENT)
Dept: SURGERY | Facility: CLINIC | Age: 27
End: 2022-04-06
Payer: COMMERCIAL

## 2022-04-06 VITALS
HEIGHT: 64 IN | WEIGHT: 155 LBS | SYSTOLIC BLOOD PRESSURE: 120 MMHG | DIASTOLIC BLOOD PRESSURE: 84 MMHG | HEART RATE: 101 BPM | BODY MASS INDEX: 26.46 KG/M2

## 2022-04-06 DIAGNOSIS — D24.1 FIBROADENOMA OF BREAST, RIGHT: ICD-10-CM

## 2022-04-06 DIAGNOSIS — Z09 POSTOP CHECK: Primary | ICD-10-CM

## 2022-04-06 PROCEDURE — 99024 POSTOP FOLLOW-UP VISIT: CPT | Mod: S$GLB,,, | Performed by: NURSE PRACTITIONER

## 2022-04-06 PROCEDURE — 3079F PR MOST RECENT DIASTOLIC BLOOD PRESSURE 80-89 MM HG: ICD-10-PCS | Mod: CPTII,S$GLB,, | Performed by: NURSE PRACTITIONER

## 2022-04-06 PROCEDURE — 3008F BODY MASS INDEX DOCD: CPT | Mod: CPTII,S$GLB,, | Performed by: NURSE PRACTITIONER

## 2022-04-06 PROCEDURE — 3079F DIAST BP 80-89 MM HG: CPT | Mod: CPTII,S$GLB,, | Performed by: NURSE PRACTITIONER

## 2022-04-06 PROCEDURE — 1159F PR MEDICATION LIST DOCUMENTED IN MEDICAL RECORD: ICD-10-PCS | Mod: CPTII,S$GLB,, | Performed by: NURSE PRACTITIONER

## 2022-04-06 PROCEDURE — 99999 PR PBB SHADOW E&M-EST. PATIENT-LVL III: CPT | Mod: PBBFAC,,, | Performed by: NURSE PRACTITIONER

## 2022-04-06 PROCEDURE — 3074F PR MOST RECENT SYSTOLIC BLOOD PRESSURE < 130 MM HG: ICD-10-PCS | Mod: CPTII,S$GLB,, | Performed by: NURSE PRACTITIONER

## 2022-04-06 PROCEDURE — 99024 PR POST-OP FOLLOW-UP VISIT: ICD-10-PCS | Mod: S$GLB,,, | Performed by: NURSE PRACTITIONER

## 2022-04-06 PROCEDURE — 99999 PR PBB SHADOW E&M-EST. PATIENT-LVL III: ICD-10-PCS | Mod: PBBFAC,,, | Performed by: NURSE PRACTITIONER

## 2022-04-06 PROCEDURE — 3074F SYST BP LT 130 MM HG: CPT | Mod: CPTII,S$GLB,, | Performed by: NURSE PRACTITIONER

## 2022-04-06 PROCEDURE — 1159F MED LIST DOCD IN RCRD: CPT | Mod: CPTII,S$GLB,, | Performed by: NURSE PRACTITIONER

## 2022-04-06 PROCEDURE — 3008F PR BODY MASS INDEX (BMI) DOCUMENTED: ICD-10-PCS | Mod: CPTII,S$GLB,, | Performed by: NURSE PRACTITIONER

## 2022-04-06 NOTE — PROGRESS NOTES
Post-Op  [facility]  Department of Surgery    REFERRING PROVIDER: No referring provider defined for this encounter. Bautista Parra MD    DIAGNOSIS:    This is a 26 y.o. female  With fibroadenoma of the right breast.    TREATMENT SUMMARY:  The patient is status US guided excisional biopsy on 3/18/2022.  Final pathology showed Fibroadenoma (4.4 cm), no atypia or malignancy.     FINAL PATHOLOGIC DIAGNOSIS  RIGHT BREAST, EXCISIONAL BIOPSY:  -Negative for atypia or malignancy.  -Benign fibroadenoma, 4.4 cm.    INTERVAL HISTORY:   Arely Valles comes in for a post-op check.  She denies fever, chills, chest pain or shortness of breath.  Her pain is well controlled.      MEDICATIONS:  Current Outpatient Medications   Medication Sig Dispense Refill    amitriptyline (ELAVIL) 50 MG tablet Take 50 mg by mouth every evening.      ETONOGESTREL (IMPLANON SDRM) by Subdermal route.      lamoTRIgine (LAMICTAL) 100 MG tablet Take 1 tablet (100 mg total) by mouth once daily. 30 tablet 4    traMADoL (ULTRAM) 50 mg tablet Take 1 tablet (50 mg total) by mouth every 6 (six) hours as needed for Pain. 10 tablet 0    vitamin D (VITAMIN D3) 1000 units Tab Take 1,000 Units by mouth 2 (two) times a day.       No current facility-administered medications for this visit.       ALLERGIES:   Review of patient's allergies indicates:   Allergen Reactions    Grass pollen-mike grass standard     Mold        PHYSICAL EXAMINATION:   General:  This is a well appearing female with appropriate speech, affect and gait.     Breast:  Incision clean, dry, and intact      IMPRESSION:   The patient has had an uneventful postoperative course.    PLAN:   1. return in 6 months for a follow up office visit and breast exam  2. Right limited US in 6 months  3. The patient is advised in continued exam of the breast chest wall and to report to this office sooner should she note any areas of abnormality or concern.     The patient is in agreement  with the plan. Questions were encouraged and answered to patient's satisfaction. Arely will call our office with any questions or concerns.

## 2022-05-04 ENCOUNTER — OFFICE VISIT (OUTPATIENT)
Dept: PSYCHIATRY | Facility: CLINIC | Age: 27
End: 2022-05-04
Payer: COMMERCIAL

## 2022-05-04 VITALS — BODY MASS INDEX: 26.43 KG/M2 | WEIGHT: 154 LBS | HEART RATE: 72 BPM

## 2022-05-04 DIAGNOSIS — F34.1 PERSISTENT DEPRESSIVE DISORDER: ICD-10-CM

## 2022-05-04 DIAGNOSIS — F41.0 PANIC ATTACKS: ICD-10-CM

## 2022-05-04 DIAGNOSIS — Z86.59 HISTORY OF ADHD: ICD-10-CM

## 2022-05-04 DIAGNOSIS — F40.10 SOCIAL ANXIETY DISORDER: Primary | ICD-10-CM

## 2022-05-04 PROCEDURE — 1159F MED LIST DOCD IN RCRD: CPT | Mod: CPTII,95,, | Performed by: PSYCHIATRY & NEUROLOGY

## 2022-05-04 PROCEDURE — 1159F PR MEDICATION LIST DOCUMENTED IN MEDICAL RECORD: ICD-10-PCS | Mod: CPTII,95,, | Performed by: PSYCHIATRY & NEUROLOGY

## 2022-05-04 PROCEDURE — 1160F RVW MEDS BY RX/DR IN RCRD: CPT | Mod: CPTII,95,, | Performed by: PSYCHIATRY & NEUROLOGY

## 2022-05-04 PROCEDURE — 1160F PR REVIEW ALL MEDS BY PRESCRIBER/CLIN PHARMACIST DOCUMENTED: ICD-10-PCS | Mod: CPTII,95,, | Performed by: PSYCHIATRY & NEUROLOGY

## 2022-05-04 PROCEDURE — 3008F BODY MASS INDEX DOCD: CPT | Mod: CPTII,95,, | Performed by: PSYCHIATRY & NEUROLOGY

## 2022-05-04 PROCEDURE — 3008F PR BODY MASS INDEX (BMI) DOCUMENTED: ICD-10-PCS | Mod: CPTII,95,, | Performed by: PSYCHIATRY & NEUROLOGY

## 2022-05-04 PROCEDURE — 99214 PR OFFICE/OUTPT VISIT, EST, LEVL IV, 30-39 MIN: ICD-10-PCS | Mod: 95,,, | Performed by: PSYCHIATRY & NEUROLOGY

## 2022-05-04 PROCEDURE — 99214 OFFICE O/P EST MOD 30 MIN: CPT | Mod: 95,,, | Performed by: PSYCHIATRY & NEUROLOGY

## 2022-05-04 RX ORDER — LAMOTRIGINE 100 MG/1
100 TABLET ORAL DAILY
Qty: 30 TABLET | Refills: 4 | Status: SHIPPED | OUTPATIENT
Start: 2022-05-04

## 2022-05-04 NOTE — PATIENT INSTRUCTIONS
1. Continue lamictal 100mg daily.  2. Continue therapy once weekly with Brielle HOOPER.    Follow up in two months

## 2022-05-04 NOTE — PROGRESS NOTES
"Subsequent Psychiatric Session- VIRTUAL  26 y.o. female    Reason for Follow Up:   Persistent depressive disorder, social anxiety, panic attacks, history of ADHD    Current Medications:    Current Outpatient Medications:     amitriptyline (ELAVIL) 50 MG tablet, Take 50 mg by mouth every evening., Disp: , Rfl:     ETONOGESTREL (IMPLANON SDRM), by Subdermal route., Disp: , Rfl:     traMADoL (ULTRAM) 50 mg tablet, Take 1 tablet (50 mg total) by mouth every 6 (six) hours as needed for Pain., Disp: 10 tablet, Rfl: 0    vitamin D (VITAMIN D3) 1000 units Tab, Take 1,000 Units by mouth 2 (two) times a day., Disp: , Rfl:     lamoTRIgine (LAMICTAL) 100 MG tablet, Take 1 tablet (100 mg total) by mouth once daily., Disp: 30 tablet, Rfl: 4     Date of Last Visit:   02/09/22    In Clinic Since:   January 2022  Therapy:    Brielle Jade    Interval History  Patient describes current mood as "pretty good."  She stopped Wellbutrin XL 150mg daily after getting to 100mg of lamictal. She has been a little down this week, thinks it is because of her period. Since last session, she had a fibroadenoma removed from her right breast. 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 Dad got . She is currently not on speaking terms with Constance. They had an argument while living with them. She is living with mom. She has maybe had one or two times where she approached a panic attack, but did not have one. She has been doing meditation and breathing exercises. She still journals. She has moments of passive SI, but they happen infrequently. She denies intent or plan. Denies medication side effects, including rashes.      Denies SI, HI, AVH.    Manic/Hypomanic Symptom Screening:  Since the last session, have you had any periods lasting at least a few days where your energy level was higher than normal and you did not need as much sleep at night to function the following day?: Denies    Substance Use " "Screening:  Alcohol: 0/30 days  Cannabis: Three times weekly, 3 hits of a vape  Anything recreational that is not prescribed: Denies    Review of Measures  PHQ-9: 15  SUSAN-7: 11    Scores from last session:  PHQ-9: 22  SUSAN-7: 16    PHQ9 Score:              18/27   GAD7 Score:              16/21    Review of PDMP  Medical marijuana last filled 02/09/22    Constitutional     VITAL SIGNS  Temp     BP      HR 72    RR      SpO2           No results found for this or any previous visit (from the past 672 hour(s)).     MENTAL STATUS EXAM  General:  Alert and oriented x 4 Appearance is good grooming and hygiene, appears stated age, BMI 26.4. Behavior: friendly and cooperative, eye contact normal.  Gait, Posture and Muscle Strength/Tone: Normal posture observed. No gross abnormal movements noted.  Psychomotor Agitation and Retardation: none evident  Speech: Normal rate, volume, articulation, and tone.  Mood: "pretty good"  Affect: euthymic  Thought Process: Linear and coherent. No flight of ideas.  Associations:  Intact. No loosening of associations.  Thought content: Denies suicidal and homicidal thoughts, plans and intentions.  Perceptions: Denies any auditory or visual hallucinations. Does not appear internally preoccupied.  Orientation: Alert and oriented to person, place, date and situation  Attention and Concentration: Intact.   Memory: Intact grossly   Language: No deficits noted   Fund of Knowledge: appropriate for age and level and education.   Insight:   good  Judgment: good  Impulse control: good      ASSESSMENT  Problem List Items Addressed This Visit        Psychiatric    Social anxiety disorder - Primary    Persistent depressive disorder    Relevant Medications    lamoTRIgine (LAMICTAL) 100 MG tablet    History of ADHD    Panic attacks        Patient is doing much better than at initial visit. Of note, patient is on a birth control that can lower the dose of lamictal, so if she gets off of the medication she " may notice a change in symptoms. She might be planning to change up or stop birth control because she is not currently sexually active. She is encouraged to talk with her Ob/Gyn about lamictal before making changes as some birth control is less effective with lamictal or vice versa.     PLAN  Patient Instructions   1. Continue lamictal 100mg daily.  2. Continue therapy once weekly with Brielle ALVARADO    Follow up in two months      -------------------------------------------------------------------------------    Demetria Amezcua  [facility] - PSYCHIATRY    Due to the pandemic, today's session was performed over [facility]. Patient is confirmed to be in the [facility]. The participants are patient and myself.    Today's encounter took a total time of 30 minutes, and that time included patient interview and documentation, ordering medication    Risks, Benefits, Side Effects and Alternatives were discussed with the patient today and consent was obtained for the current medication regimen. The patient was encouraged to ask questions and these questions were answered and the patient was engaged in psychoeducation regarding diagnosis, course of illness, accuracy of the diagnosis, and other general elements regarding overall diagnosis and treatment consideration.     Any medications being used off-label were discussed with the patient inclusive of the evidence base for the use of the medications and consent was obtained for the off-label use of the medication.     Brief suicide risk assessment was performed with the patient today and safety planning was performed if indicated.    Note completed by: Demetria Amezcua MD, 5/4/2022 9:13 AM

## 2022-06-14 ENCOUNTER — PATIENT MESSAGE (OUTPATIENT)
Dept: PSYCHIATRY | Facility: CLINIC | Age: 27
End: 2022-06-14
Payer: COMMERCIAL

## 2022-06-14 ENCOUNTER — OFFICE VISIT (OUTPATIENT)
Dept: PSYCHIATRY | Facility: CLINIC | Age: 27
End: 2022-06-14
Payer: COMMERCIAL

## 2022-06-14 VITALS
TEMPERATURE: 98 F | DIASTOLIC BLOOD PRESSURE: 66 MMHG | WEIGHT: 172.81 LBS | HEART RATE: 85 BPM | BODY MASS INDEX: 29.67 KG/M2 | SYSTOLIC BLOOD PRESSURE: 118 MMHG

## 2022-06-14 DIAGNOSIS — F40.10 SOCIAL ANXIETY DISORDER: Primary | ICD-10-CM

## 2022-06-14 DIAGNOSIS — Z86.59 HISTORY OF ADHD: ICD-10-CM

## 2022-06-14 DIAGNOSIS — F41.0 PANIC ATTACKS: ICD-10-CM

## 2022-06-14 DIAGNOSIS — F34.1 PERSISTENT DEPRESSIVE DISORDER: ICD-10-CM

## 2022-06-14 PROCEDURE — 1159F PR MEDICATION LIST DOCUMENTED IN MEDICAL RECORD: ICD-10-PCS | Mod: CPTII,S$GLB,, | Performed by: PSYCHIATRY & NEUROLOGY

## 2022-06-14 PROCEDURE — 99214 OFFICE O/P EST MOD 30 MIN: CPT | Mod: S$GLB,,, | Performed by: PSYCHIATRY & NEUROLOGY

## 2022-06-14 PROCEDURE — 99214 PR OFFICE/OUTPT VISIT, EST, LEVL IV, 30-39 MIN: ICD-10-PCS | Mod: S$GLB,,, | Performed by: PSYCHIATRY & NEUROLOGY

## 2022-06-14 PROCEDURE — 1160F PR REVIEW ALL MEDS BY PRESCRIBER/CLIN PHARMACIST DOCUMENTED: ICD-10-PCS | Mod: CPTII,S$GLB,, | Performed by: PSYCHIATRY & NEUROLOGY

## 2022-06-14 PROCEDURE — 1159F MED LIST DOCD IN RCRD: CPT | Mod: CPTII,S$GLB,, | Performed by: PSYCHIATRY & NEUROLOGY

## 2022-06-14 PROCEDURE — 3074F PR MOST RECENT SYSTOLIC BLOOD PRESSURE < 130 MM HG: ICD-10-PCS | Mod: CPTII,S$GLB,, | Performed by: PSYCHIATRY & NEUROLOGY

## 2022-06-14 PROCEDURE — 99999 PR PBB SHADOW E&M-EST. PATIENT-LVL III: CPT | Mod: PBBFAC,,, | Performed by: PSYCHIATRY & NEUROLOGY

## 2022-06-14 PROCEDURE — 3078F PR MOST RECENT DIASTOLIC BLOOD PRESSURE < 80 MM HG: ICD-10-PCS | Mod: CPTII,S$GLB,, | Performed by: PSYCHIATRY & NEUROLOGY

## 2022-06-14 PROCEDURE — 1160F RVW MEDS BY RX/DR IN RCRD: CPT | Mod: CPTII,S$GLB,, | Performed by: PSYCHIATRY & NEUROLOGY

## 2022-06-14 PROCEDURE — 3078F DIAST BP <80 MM HG: CPT | Mod: CPTII,S$GLB,, | Performed by: PSYCHIATRY & NEUROLOGY

## 2022-06-14 PROCEDURE — 3074F SYST BP LT 130 MM HG: CPT | Mod: CPTII,S$GLB,, | Performed by: PSYCHIATRY & NEUROLOGY

## 2022-06-14 PROCEDURE — 99999 PR PBB SHADOW E&M-EST. PATIENT-LVL III: ICD-10-PCS | Mod: PBBFAC,,, | Performed by: PSYCHIATRY & NEUROLOGY

## 2022-06-14 PROCEDURE — 3008F BODY MASS INDEX DOCD: CPT | Mod: CPTII,S$GLB,, | Performed by: PSYCHIATRY & NEUROLOGY

## 2022-06-14 PROCEDURE — 3008F PR BODY MASS INDEX (BMI) DOCUMENTED: ICD-10-PCS | Mod: CPTII,S$GLB,, | Performed by: PSYCHIATRY & NEUROLOGY

## 2022-06-14 NOTE — PATIENT INSTRUCTIONS
1. Continue lamictal 100mg daily.  2. Continue therapy once weekly with Brielle ALVARADO     Follow up in one month

## 2022-06-14 NOTE — PROGRESS NOTES
"Subsequent Psychiatric Session  27 y.o. female    Reason for Follow Up:   Persistent depressive disorder, social anxiety, panic attacks, history of ADHD    Current Medications:    Current Outpatient Medications:     amitriptyline (ELAVIL) 50 MG tablet, Take 100 mg by mouth every evening., Disp: , Rfl:     ETONOGESTREL (IMPLANON SDRM), by Subdermal route., Disp: , Rfl:     lamoTRIgine (LAMICTAL) 100 MG tablet, Take 1 tablet (100 mg total) by mouth once daily., Disp: 30 tablet, Rfl: 4    traMADoL (ULTRAM) 50 mg tablet, Take 1 tablet (50 mg total) by mouth every 6 (six) hours as needed for Pain., Disp: 10 tablet, Rfl: 0    vitamin D (VITAMIN D3) 1000 units Tab, Take 1,000 Units by mouth 2 (two) times a day., Disp: , Rfl:      Date of Last Visit:   05/04/22    In Clinic Since:   January 2022  Therapy:    Brielle Jade    Interval History  She feels "pretty good." She moved back in with her dad yesterday. She is getting along with Constance again. She feels like she is a burden on her parents. She dislikes her fibromyalgia diet "the Mucinex Diet." She says, "basically everything that's good is gone." She has to take [id number] of Mucinex twice daily on this plan. She is walking more. She thinks exercise helps. Stress is a big trigger. She has passive SI. She denies intent or plan. Denies medication side effects, including rashes.      Denies HI, AVH.     Manic/Hypomanic Symptom Screening:  Since the last session, have you had any periods lasting at least a few days where your energy level was higher than normal and you did not need as much sleep at night to function the following day?: Denies    Substance Use Screening:  Alcohol: 0/30 days  Cannabis: Three times weekly, 3 hits of a vape  Anything recreational that is not prescribed: Denies    Review of Measures  PHQ-9: 13  SUSAN-7: 10    Scores from last session:  PHQ-9: 15  SUSNA-7: 11    PHQ-9: 22  SUSAN-7: 16    PHQ9 Score:              18/27   GAD7 Score:              " "16/21    Review of [facility]  Medical marijuana last filled 02/09/22    Constitutional     VITAL SIGNS  Temp 97.8 °F (36.6 °C)   /66    HR 85    RR      SpO2           No results found for this or any previous visit (from the past 672 hour(s)).     MENTAL STATUS EXAM  General:  Alert and oriented x 4 Appearance is good grooming and hygiene, appears stated age, BMI 29.67. Behavior: friendly and cooperative, eye contact normal.  Gait, Posture and Muscle Strength/Tone: Normal posture and gait observed. No gross abnormal movements noted.  Psychomotor Agitation and Retardation: none evident  Speech: Normal rate, volume, articulation, and tone.  Mood: pretty good"  Affect: euthymic  Thought Process: Linear and coherent. No flight of ideas.  Associations:  Intact. No loosening of associations.  Thought content: Denies suicidal and homicidal thoughts, plans and intentions.  Perceptions: Denies any auditory or visual hallucinations. Does not appear internally preoccupied.  Orientation: Alert and oriented to person, place, date and situation  Attention and Concentration: Intact.   Memory: Intact grossly   Language: No deficits noted   Fund of Knowledge: appropriate for age and level and education.   Insight:   good  Judgment: good  Impulse control: good      ASSESSMENT  Problem List Items Addressed This Visit        Psychiatric    Social anxiety disorder - Primary    Persistent depressive disorder    History of ADHD    Panic attacks           PLAN  Patient Instructions   1. Continue lamictal 100mg daily.  2. Continue therapy once weekly with Brielle ALVARADO     Follow up in one month      -------------------------------------------------------------------------------    Demetria Amezcua  [facility]    Today's encounter took a total time of 30 minutes, and that time included patient interview and documentation, ordering medication    Risks, Benefits, Side Effects and Alternatives were discussed with the patient today and " consent was obtained for the current medication regimen. The patient was encouraged to ask questions and these questions were answered and the patient was engaged in psychoeducation regarding diagnosis, course of illness, accuracy of the diagnosis, and other general elements regarding overall diagnosis and treatment consideration.     Any medications being used off-label were discussed with the patient inclusive of the evidence base for the use of the medications and consent was obtained for the off-label use of the medication.     Brief suicide risk assessment was performed with the patient today and safety planning was performed if indicated.    Note completed by: Demetria Amezcua MD, 6/22/2022 9:13 AM

## 2022-08-05 ENCOUNTER — TELEPHONE (OUTPATIENT)
Dept: PSYCHIATRY | Facility: CLINIC | Age: 27
End: 2022-08-05
Payer: COMMERCIAL

## 2022-08-05 NOTE — TELEPHONE ENCOUNTER
Spoke with patient's mother, Zulay. Arely is at [facility] in [place]. Per mom, she is enjoying her stay so far and is making friends. She thinks a particular meditation exercise made her fibromyalgia worse. They sent her to an urgent care and received tylenol and muscle relaxer.

## 2023-04-19 ENCOUNTER — PATIENT MESSAGE (OUTPATIENT)
Dept: PSYCHIATRY | Facility: CLINIC | Age: 28
End: 2023-04-19
Payer: COMMERCIAL

## 2023-12-11 ENCOUNTER — APPOINTMENT (OUTPATIENT)
Dept: URBAN - METROPOLITAN AREA CLINIC 224 | Age: 28
Setting detail: DERMATOLOGY
End: 2023-12-11

## 2023-12-11 DIAGNOSIS — L70.0 ACNE VULGARIS: ICD-10-CM

## 2023-12-11 DIAGNOSIS — L30.1 DYSHIDROSIS [POMPHOLYX]: ICD-10-CM

## 2023-12-11 PROBLEM — L30.9 DERMATITIS, UNSPECIFIED: Status: ACTIVE | Noted: 2023-12-11

## 2023-12-11 PROCEDURE — OTHER PRESCRIPTION: OTHER

## 2023-12-11 PROCEDURE — 99204 OFFICE O/P NEW MOD 45 MIN: CPT

## 2023-12-11 PROCEDURE — OTHER COUNSELING: OTHER

## 2023-12-11 PROCEDURE — OTHER TREATMENT REGIMEN: OTHER

## 2023-12-11 PROCEDURE — OTHER PRESCRIPTION MEDICATION MANAGEMENT: OTHER

## 2023-12-11 RX ORDER — DOXYCYCLINE HYCLATE 100 MG/1
CAPSULE, GELATIN COATED ORAL
Qty: 60 | Refills: 2 | Status: ERX | COMMUNITY
Start: 2023-12-11

## 2023-12-11 RX ORDER — ADAPALENE 1 MG/G
CREAM TOPICAL QHS
Qty: 45 | Refills: 1 | Status: ERX | COMMUNITY
Start: 2023-12-11

## 2023-12-11 RX ORDER — CLINDAMYCIN PHOSPHATE 10 MG/ML
LOTION TOPICAL
Qty: 60 | Refills: 1 | Status: ERX | COMMUNITY
Start: 2023-12-11

## 2023-12-11 ASSESSMENT — LOCATION DETAILED DESCRIPTION DERM
LOCATION DETAILED: RIGHT RADIAL PALM
LOCATION DETAILED: LEFT INFERIOR CENTRAL MALAR CHEEK
LOCATION DETAILED: RIGHT INFERIOR CENTRAL MALAR CHEEK
LOCATION DETAILED: LEFT THENAR EMINENCE

## 2023-12-11 ASSESSMENT — LOCATION ZONE DERM
LOCATION ZONE: HAND
LOCATION ZONE: FACE

## 2023-12-11 ASSESSMENT — LOCATION SIMPLE DESCRIPTION DERM
LOCATION SIMPLE: RIGHT CHEEK
LOCATION SIMPLE: LEFT CHEEK
LOCATION SIMPLE: LEFT HAND
LOCATION SIMPLE: RIGHT HAND

## 2023-12-11 NOTE — PROCEDURE: TREATMENT REGIMEN
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 Was given TAC 0.1%. Patient is clear today and will call when she needs refills
Detail Level: Detailed

## 2023-12-11 NOTE — PROCEDURE: COUNSELING
Winlevi Pregnancy And Lactation Text: This medication is considered safe during pregnancy and breastfeeding.
Detail Level: Zone
High Dose Vitamin A Counseling: Side effects reviewed, pt to contact office should one occur.
Topical Retinoid counseling:  Patient advised to apply a pea-sized amount only at bedtime and wait 30 minutes after washing their face before applying.  If too drying, patient may add a non-comedogenic moisturizer. The patient verbalized understanding of the proper use and possible adverse effects of retinoids.  All of the patient's questions and concerns were addressed.
Topical Sulfur Applications Pregnancy And Lactation Text: This medication is Pregnancy Category C and has an unknown safety profile during pregnancy. It is unknown if this topical medication is excreted in breast milk.
Dapsone Pregnancy And Lactation Text: This medication is Pregnancy Category C and is not considered safe during pregnancy or breast feeding.
Benzoyl Peroxide Counseling: Patient counseled that medicine may cause skin irritation and bleach clothing.  In the event of skin irritation, the patient was advised to reduce the amount of the drug applied or use it less frequently.   The patient verbalized understanding of the proper use and possible adverse effects of benzoyl peroxide.  All of the patient's questions and concerns were addressed.
Spironolactone Pregnancy And Lactation Text: This medication can cause feminization of the male fetus and should be avoided during pregnancy. The active metabolite is also found in breast milk.
Sarecycline Pregnancy And Lactation Text: This medication is Pregnancy Category D and not consider safe during pregnancy. It is also excreted in breast milk.
Isotretinoin Counseling: Patient should get monthly blood tests, not donate blood, not drive at night if vision affected, not share medication, and not undergo elective surgery for 6 months after tx completed. Side effects reviewed, pt to contact office should one occur.
Topical Clindamycin Pregnancy And Lactation Text: This medication is Pregnancy Category B and is considered safe during pregnancy. It is unknown if it is excreted in breast milk.
Include Pregnancy/Lactation Warning?: No
Azelaic Acid Counseling: Patient counseled that medicine may cause skin irritation and to avoid applying near the eyes.  In the event of skin irritation, the patient was advised to reduce the amount of the drug applied or use it less frequently.   The patient verbalized understanding of the proper use and possible adverse effects of azelaic acid.  All of the patient's questions and concerns were addressed.
Dapsone Counseling: I discussed with the patient the risks of dapsone including but not limited to hemolytic anemia, agranulocytosis, rashes, methemoglobinemia, kidney failure, peripheral neuropathy, headaches, GI upset, and liver toxicity.  Patients who start dapsone require monitoring including baseline LFTs and weekly CBCs for the first month, then every month thereafter.  The patient verbalized understanding of the proper use and possible adverse effects of dapsone.  All of the patient's questions and concerns were addressed.
Birth Control Pills Counseling: Birth Control Pill Counseling: I discussed with the patient the potential side effects of OCPs including but not limited to increased risk of stroke, heart attack, thrombophlebitis, deep venous thrombosis, hepatic adenomas, breast changes, GI upset, headaches, and depression.  The patient verbalized understanding of the proper use and possible adverse effects of OCPs. All of the patient's questions and concerns were addressed.
Erythromycin Counseling:  I discussed with the patient the risks of erythromycin including but not limited to GI upset, allergic reaction, drug rash, diarrhea, increase in liver enzymes, and yeast infections.
Tazorac Pregnancy And Lactation Text: This medication is not safe during pregnancy. It is unknown if this medication is excreted in breast milk.
Aklief counseling:  Patient advised to apply a pea-sized amount only at bedtime and wait 30 minutes after washing their face before applying.  If too drying, patient may add a non-comedogenic moisturizer.  The most commonly reported side effects including irritation, redness, scaling, dryness, stinging, burning, itching, and increased risk of sunburn.  The patient verbalized understanding of the proper use and possible adverse effects of retinoids.  All of the patient's questions and concerns were addressed.
Bactrim Counseling:  I discussed with the patient the risks of sulfa antibiotics including but not limited to GI upset, allergic reaction, drug rash, diarrhea, dizziness, photosensitivity, and yeast infections.  Rarely, more serious reactions can occur including but not limited to aplastic anemia, agranulocytosis, methemoglobinemia, blood dyscrasias, liver or kidney failure, lung infiltrates or desquamative/blistering drug rashes.
Doxycycline Counseling:  Patient counseled regarding possible photosensitivity and increased risk for sunburn.  Patient instructed to avoid sunlight, if possible.  When exposed to sunlight, patients should wear protective clothing, sunglasses, and sunscreen.  The patient was instructed to call the office immediately if the following severe adverse effects occur:  hearing changes, easy bruising/bleeding, severe headache, or vision changes.  The patient verbalized understanding of the proper use and possible adverse effects of doxycycline.  All of the patient's questions and concerns were addressed.
High Dose Vitamin A Pregnancy And Lactation Text: High dose vitamin A therapy is contraindicated during pregnancy and breast feeding.
Topical Retinoid Pregnancy And Lactation Text: This medication is Pregnancy Category C. It is unknown if this medication is excreted in breast milk.
Tetracycline Counseling: Patient counseled regarding possible photosensitivity and increased risk for sunburn.  Patient instructed to avoid sunlight, if possible.  When exposed to sunlight, patients should wear protective clothing, sunglasses, and sunscreen.  The patient was instructed to call the office immediately if the following severe adverse effects occur:  hearing changes, easy bruising/bleeding, severe headache, or vision changes.  The patient verbalized understanding of the proper use and possible adverse effects of tetracycline.  All of the patient's questions and concerns were addressed. Patient understands to avoid pregnancy while on therapy due to potential birth defects.
Winlevi Counseling:  I discussed with the patient the risks of topical clascoterone including but not limited to erythema, scaling, itching, and stinging. Patient voiced their understanding.
Azithromycin Counseling:  I discussed with the patient the risks of azithromycin including but not limited to GI upset, allergic reaction, drug rash, diarrhea, and yeast infections.
Spironolactone Counseling: Patient advised regarding risks of diarrhea, abdominal pain, hyperkalemia, birth defects (for female patients), liver toxicity and renal toxicity. The patient may need blood work to monitor liver and kidney function and potassium levels while on therapy. The patient verbalized understanding of the proper use and possible adverse effects of spironolactone.  All of the patient's questions and concerns were addressed.
Benzoyl Peroxide Pregnancy And Lactation Text: This medication is Pregnancy Category C. It is unknown if benzoyl peroxide is excreted in breast milk.
Isotretinoin Pregnancy And Lactation Text: This medication is Pregnancy Category X and is considered extremely dangerous during pregnancy. It is unknown if it is excreted in breast milk.
Topical Sulfur Applications Counseling: Topical Sulfur Counseling: Patient counseled that this medication may cause skin irritation or allergic reactions.  In the event of skin irritation, the patient was advised to reduce the amount of the drug applied or use it less frequently.   The patient verbalized understanding of the proper use and possible adverse effects of topical sulfur application.  All of the patient's questions and concerns were addressed.
Azelaic Acid Pregnancy And Lactation Text: This medication is considered safe during pregnancy and breast feeding.
Topical Clindamycin Counseling: Patient counseled that this medication may cause skin irritation or allergic reactions.  In the event of skin irritation, the patient was advised to reduce the amount of the drug applied or use it less frequently.   The patient verbalized understanding of the proper use and possible adverse effects of clindamycin.  All of the patient's questions and concerns were addressed.
Aklief Pregnancy And Lactation Text: It is unknown if this medication is safe to use during pregnancy.  It is unknown if this medication is excreted in breast milk.  Breastfeeding women should use the topical cream on the smallest area of the skin for the shortest time needed while breastfeeding.  Do not apply to nipple and areola.
Sarecycline Counseling: Patient advised regarding possible photosensitivity and discoloration of the teeth, skin, lips, tongue and gums.  Patient instructed to avoid sunlight, if possible.  When exposed to sunlight, patients should wear protective clothing, sunglasses, and sunscreen.  The patient was instructed to call the office immediately if the following severe adverse effects occur:  hearing changes, easy bruising/bleeding, severe headache, or vision changes.  The patient verbalized understanding of the proper use and possible adverse effects of sarecycline.  All of the patient's questions and concerns were addressed.
Erythromycin Pregnancy And Lactation Text: This medication is Pregnancy Category B and is considered safe during pregnancy. It is also excreted in breast milk.
Bactrim Pregnancy And Lactation Text: This medication is Pregnancy Category D and is known to cause fetal risk.  It is also excreted in breast milk.
Doxycycline Pregnancy And Lactation Text: This medication is Pregnancy Category D and not consider safe during pregnancy. It is also excreted in breast milk but is considered safe for shorter treatment courses.
Tazorac Counseling:  Patient advised that medication is irritating and drying.  Patient may need to apply sparingly and wash off after an hour before eventually leaving it on overnight.  The patient verbalized understanding of the proper use and possible adverse effects of tazorac.  All of the patient's questions and concerns were addressed.
Azithromycin Pregnancy And Lactation Text: This medication is considered safe during pregnancy and is also secreted in breast milk.
Minocycline Counseling: Patient advised regarding possible photosensitivity and discoloration of the teeth, skin, lips, tongue and gums.  Patient instructed to avoid sunlight, if possible.  When exposed to sunlight, patients should wear protective clothing, sunglasses, and sunscreen.  The patient was instructed to call the office immediately if the following severe adverse effects occur:  hearing changes, easy bruising/bleeding, severe headache, or vision changes.  The patient verbalized understanding of the proper use and possible adverse effects of minocycline.  All of the patient's questions and concerns were addressed.
Birth Control Pills Pregnancy And Lactation Text: This medication should be avoided if pregnant and for the first 30 days post-partum.
Detail Level: Simple
no concerns

## 2023-12-11 NOTE — PROCEDURE: PRESCRIPTION MEDICATION MANAGEMENT
Initiate Treatment: Cetaphil BPO cleanser\\n\\ndoxycycline hyclate 100 mg capsule \\n\\nclindamycin 1 % lotion\\n\\nDifferin 0.1 % topical cream QHS
Plan: Patient states she has really sensitive skin, and had burning and increase of acne with BPO and SA
Render In Strict Bullet Format?: No
Detail Level: Zone
Samples Given: Cetaphil BPO cleanser, cetaphil gentle cleansers, vanicream

## (undated) DEVICE — UNDERGLOVES BIOGEL PI SZ 7 LF

## (undated) DEVICE — TOWEL OR DISP STRL BLUE 4/PK

## (undated) DEVICE — DRAPE STERI INSTRUMENT 1018

## (undated) DEVICE — APPLICATOR CHLORAPREP ORN 26ML

## (undated) DEVICE — ELECTRODE REM PLYHSV RETURN 9

## (undated) DEVICE — Device

## (undated) DEVICE — SUT VICRYL 3-0 27 SH

## (undated) DEVICE — ELECTRODE BLADE INSULATED 1 IN

## (undated) DEVICE — SEE MEDLINE ITEM 157131

## (undated) DEVICE — NDL HYPO REG 25G X 1 1/2

## (undated) DEVICE — CONTAINER SPECIMEN OR STER 4OZ

## (undated) DEVICE — COVER PROBE US 5.5X58L NON LTX

## (undated) DEVICE — ELECTRODE BLADE W/SLEEVE 2.75

## (undated) DEVICE — SPONGE SUPER KERLIX 6X6.75IN

## (undated) DEVICE — GLOVE BIOGEL SKINSENSE PI 6.5

## (undated) DEVICE — ADHESIVE DERMABOND ADVANCED

## (undated) DEVICE — SUT 2/0 30IN SILK BLK BRAI

## (undated) DEVICE — MARKER SKIN STND TIP BLUE BARR

## (undated) DEVICE — SUT VICRYL PLUS 4-0 PS2 27

## (undated) DEVICE — SYR 10CC LUER LOCK

## (undated) DEVICE — GOWN SMART IMP BREATHABLE XXLG